# Patient Record
Sex: FEMALE | Race: WHITE | NOT HISPANIC OR LATINO
[De-identification: names, ages, dates, MRNs, and addresses within clinical notes are randomized per-mention and may not be internally consistent; named-entity substitution may affect disease eponyms.]

---

## 2017-03-16 ENCOUNTER — RESULT REVIEW (OUTPATIENT)
Age: 57
End: 2017-03-16

## 2019-01-14 ENCOUNTER — EMERGENCY (EMERGENCY)
Facility: HOSPITAL | Age: 59
LOS: 1 days | End: 2019-01-14
Attending: EMERGENCY MEDICINE
Payer: COMMERCIAL

## 2019-01-14 VITALS
HEART RATE: 123 BPM | TEMPERATURE: 98 F | HEIGHT: 65 IN | OXYGEN SATURATION: 97 % | RESPIRATION RATE: 18 BRPM | SYSTOLIC BLOOD PRESSURE: 119 MMHG | DIASTOLIC BLOOD PRESSURE: 80 MMHG | WEIGHT: 149.03 LBS

## 2019-01-14 VITALS
OXYGEN SATURATION: 98 % | TEMPERATURE: 98 F | SYSTOLIC BLOOD PRESSURE: 110 MMHG | DIASTOLIC BLOOD PRESSURE: 88 MMHG | RESPIRATION RATE: 16 BRPM | HEART RATE: 85 BPM

## 2019-01-14 LAB
ALBUMIN SERPL ELPH-MCNC: 4.3 G/DL — SIGNIFICANT CHANGE UP (ref 3.3–5)
ALP SERPL-CCNC: 36 U/L — LOW (ref 40–120)
ALT FLD-CCNC: 13 U/L — SIGNIFICANT CHANGE UP (ref 10–45)
ANION GAP SERPL CALC-SCNC: 15 MMOL/L — SIGNIFICANT CHANGE UP (ref 5–17)
AST SERPL-CCNC: 15 U/L — SIGNIFICANT CHANGE UP (ref 10–40)
BASOPHILS # BLD AUTO: 0 K/UL — SIGNIFICANT CHANGE UP (ref 0–0.2)
BASOPHILS NFR BLD AUTO: 0.6 % — SIGNIFICANT CHANGE UP (ref 0–2)
BILIRUB SERPL-MCNC: 0.3 MG/DL — SIGNIFICANT CHANGE UP (ref 0.2–1.2)
BUN SERPL-MCNC: 14 MG/DL — SIGNIFICANT CHANGE UP (ref 7–23)
CALCIUM SERPL-MCNC: 9.3 MG/DL — SIGNIFICANT CHANGE UP (ref 8.4–10.5)
CHLORIDE SERPL-SCNC: 99 MMOL/L — SIGNIFICANT CHANGE UP (ref 96–108)
CO2 SERPL-SCNC: 23 MMOL/L — SIGNIFICANT CHANGE UP (ref 22–31)
CREAT SERPL-MCNC: 0.71 MG/DL — SIGNIFICANT CHANGE UP (ref 0.5–1.3)
D DIMER BLD IA.RAPID-MCNC: 811 NG/ML DDU — HIGH
EOSINOPHIL # BLD AUTO: 0.1 K/UL — SIGNIFICANT CHANGE UP (ref 0–0.5)
EOSINOPHIL NFR BLD AUTO: 1.6 % — SIGNIFICANT CHANGE UP (ref 0–6)
GLUCOSE SERPL-MCNC: 100 MG/DL — HIGH (ref 70–99)
HCT VFR BLD CALC: 38.9 % — SIGNIFICANT CHANGE UP (ref 34.5–45)
HGB BLD-MCNC: 13.6 G/DL — SIGNIFICANT CHANGE UP (ref 11.5–15.5)
LYMPHOCYTES # BLD AUTO: 1.2 K/UL — SIGNIFICANT CHANGE UP (ref 1–3.3)
LYMPHOCYTES # BLD AUTO: 18 % — SIGNIFICANT CHANGE UP (ref 13–44)
MCHC RBC-ENTMCNC: 31.1 PG — SIGNIFICANT CHANGE UP (ref 27–34)
MCHC RBC-ENTMCNC: 35 GM/DL — SIGNIFICANT CHANGE UP (ref 32–36)
MCV RBC AUTO: 88.9 FL — SIGNIFICANT CHANGE UP (ref 80–100)
MONOCYTES # BLD AUTO: 0.4 K/UL — SIGNIFICANT CHANGE UP (ref 0–0.9)
MONOCYTES NFR BLD AUTO: 6.8 % — SIGNIFICANT CHANGE UP (ref 2–14)
NEUTROPHILS # BLD AUTO: 4.7 K/UL — SIGNIFICANT CHANGE UP (ref 1.8–7.4)
NEUTROPHILS NFR BLD AUTO: 73.1 % — SIGNIFICANT CHANGE UP (ref 43–77)
PLATELET # BLD AUTO: 348 K/UL — SIGNIFICANT CHANGE UP (ref 150–400)
POTASSIUM SERPL-MCNC: 3.8 MMOL/L — SIGNIFICANT CHANGE UP (ref 3.5–5.3)
POTASSIUM SERPL-SCNC: 3.8 MMOL/L — SIGNIFICANT CHANGE UP (ref 3.5–5.3)
PROT SERPL-MCNC: 7.2 G/DL — SIGNIFICANT CHANGE UP (ref 6–8.3)
RBC # BLD: 4.38 M/UL — SIGNIFICANT CHANGE UP (ref 3.8–5.2)
RBC # FLD: 11.6 % — SIGNIFICANT CHANGE UP (ref 10.3–14.5)
SODIUM SERPL-SCNC: 137 MMOL/L — SIGNIFICANT CHANGE UP (ref 135–145)
TROPONIN T, HIGH SENSITIVITY RESULT: <6 NG/L — SIGNIFICANT CHANGE UP (ref 0–51)
WBC # BLD: 6.4 K/UL — SIGNIFICANT CHANGE UP (ref 3.8–10.5)
WBC # FLD AUTO: 6.4 K/UL — SIGNIFICANT CHANGE UP (ref 3.8–10.5)

## 2019-01-14 PROCEDURE — 82962 GLUCOSE BLOOD TEST: CPT

## 2019-01-14 PROCEDURE — 70498 CT ANGIOGRAPHY NECK: CPT | Mod: 26

## 2019-01-14 PROCEDURE — 99284 EMERGENCY DEPT VISIT MOD MDM: CPT | Mod: 25

## 2019-01-14 PROCEDURE — 71275 CT ANGIOGRAPHY CHEST: CPT | Mod: 26

## 2019-01-14 PROCEDURE — 85379 FIBRIN DEGRADATION QUANT: CPT

## 2019-01-14 PROCEDURE — 96374 THER/PROPH/DIAG INJ IV PUSH: CPT | Mod: XU

## 2019-01-14 PROCEDURE — 85610 PROTHROMBIN TIME: CPT

## 2019-01-14 PROCEDURE — 73502 X-RAY EXAM HIP UNI 2-3 VIEWS: CPT

## 2019-01-14 PROCEDURE — 83880 ASSAY OF NATRIURETIC PEPTIDE: CPT

## 2019-01-14 PROCEDURE — 93010 ELECTROCARDIOGRAM REPORT: CPT

## 2019-01-14 PROCEDURE — 70496 CT ANGIOGRAPHY HEAD: CPT

## 2019-01-14 PROCEDURE — 99285 EMERGENCY DEPT VISIT HI MDM: CPT | Mod: 25

## 2019-01-14 PROCEDURE — 85730 THROMBOPLASTIN TIME PARTIAL: CPT

## 2019-01-14 PROCEDURE — 73502 X-RAY EXAM HIP UNI 2-3 VIEWS: CPT | Mod: 26,RT

## 2019-01-14 PROCEDURE — 93005 ELECTROCARDIOGRAM TRACING: CPT

## 2019-01-14 PROCEDURE — 70498 CT ANGIOGRAPHY NECK: CPT

## 2019-01-14 PROCEDURE — 71275 CT ANGIOGRAPHY CHEST: CPT

## 2019-01-14 PROCEDURE — 96375 TX/PRO/DX INJ NEW DRUG ADDON: CPT | Mod: XU

## 2019-01-14 PROCEDURE — 80053 COMPREHEN METABOLIC PANEL: CPT

## 2019-01-14 PROCEDURE — 85027 COMPLETE CBC AUTOMATED: CPT

## 2019-01-14 PROCEDURE — 84484 ASSAY OF TROPONIN QUANT: CPT

## 2019-01-14 PROCEDURE — 70496 CT ANGIOGRAPHY HEAD: CPT | Mod: 26

## 2019-01-14 RX ORDER — SODIUM CHLORIDE 9 MG/ML
1000 INJECTION INTRAMUSCULAR; INTRAVENOUS; SUBCUTANEOUS ONCE
Qty: 0 | Refills: 0 | Status: COMPLETED | OUTPATIENT
Start: 2019-01-14 | End: 2019-01-14

## 2019-01-14 RX ORDER — ACETAMINOPHEN 500 MG
1000 TABLET ORAL ONCE
Qty: 0 | Refills: 0 | Status: COMPLETED | OUTPATIENT
Start: 2019-01-14 | End: 2019-01-14

## 2019-01-14 RX ORDER — METOCLOPRAMIDE HCL 10 MG
10 TABLET ORAL ONCE
Qty: 0 | Refills: 0 | Status: COMPLETED | OUTPATIENT
Start: 2019-01-14 | End: 2019-01-14

## 2019-01-14 RX ADMIN — SODIUM CHLORIDE 2000 MILLILITER(S): 9 INJECTION INTRAMUSCULAR; INTRAVENOUS; SUBCUTANEOUS at 20:08

## 2019-01-14 RX ADMIN — Medication 10 MILLIGRAM(S): at 20:08

## 2019-01-14 RX ADMIN — Medication 400 MILLIGRAM(S): at 18:37

## 2019-01-14 NOTE — ED ADULT NURSE REASSESSMENT NOTE - NS ED NURSE REASSESS COMMENT FT1
Pt d/c home w/ written and verbal instructions. Pt verbalized understanding. IV d/c. No s&s of infection/infiltration. VSS. Pt states " I am ready to go home". Pt assisted to waiting room via wheelchair. VSS NAD. Safety and comfort measures maintained.

## 2019-01-14 NOTE — ED PROVIDER NOTE - OBJECTIVE STATEMENT
59yo F hx of total C spine fusion 4 weeks prior here with gradual onset b/l pressure like headache (no hx of headaches), n/v, decreased PO, myalgias, and intermittent vague R sided chest pain (no hx of exertional sx), and subjective fevers. Tylenol to no relief at home. Pt states she felt very lightheaded while going down stairs today at 11am, and hit her head into a wall, no loc, did not have cp, sob, or palpitations at that time. No abd pain, sob, back pain, urinary sx, focal neuro deficit complaints, or neck pain. Has been asymptomatic since the surgery up until friday. No sick contacts. States has also been having R hip pain

## 2019-01-14 NOTE — ED ADULT NURSE REASSESSMENT NOTE - NS ED NURSE REASSESS COMMENT FT1
Report received from Columba YARBROUGH. Pt AAOx4, NAD, resp nonlabored, skin warm/dry, resting comfortably in bed with family at bedside. Pt states "I feel much better than when I came in". Pt denies headache, dizziness, chest pain, palpitations, SOB, abd pain, n/v/d, urinary symptoms, fevers, chills, weakness at this time. Pt awaiting CT scan results . Safety maintained.

## 2019-01-14 NOTE — ED ADULT NURSE NOTE - OBJECTIVE STATEMENT
58y.o female pmh c-spin fusion surgery 4 weeks ago presenting to ED c.o HA, Nausea, vomiting, decreased PO intake and body aches x the passed few days. pt states she has been taking tylenol at home with no relief. states that she has been feeling worsening weakness associated with a pressure like HA. denies having any s/s after the surgery or up until now. verbalizes feeling well until approx 3 days ago. denies any known fevers, sick contacts, cp, sob, numbness, tingling, burning upon urination or back pain. labs and line obtained. pending results. safety and fall precautions maintained.

## 2019-01-14 NOTE — ED ADULT TRIAGE NOTE - CHIEF COMPLAINT QUOTE
S/P spinal surgery 4 weeks ago. Right hip pain, headache, nausea, dizziness that started Friday night.

## 2019-01-14 NOTE — ED PROVIDER NOTE - MEDICAL DECISION MAKING DETAILS
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

## 2019-01-14 NOTE — ED PROVIDER NOTE - PHYSICAL EXAMINATION
Gen: Well appearing, NAD  Head: NCAT  HEENT: PERRL, MMM, normal conjunctiva, anicteric, well healed surgical scar on neck  Lung: CTAB, no adventitious sounds  CV: RRR, no murmurs  Abd: soft, NTND, no rebound or guarding, no CVAT  MSK: No edema, no visible deformities, R hip full ROM nonttp  Neuro: CN II-XII grossly intact. 5/5 strength and normal sensation in all extremities. Cerebellar signs intact  Skin: Warm and dry, no evidence of rash  Psych: normal mood and affect

## 2019-01-14 NOTE — ED PROVIDER NOTE - ATTENDING CONTRIBUTION TO CARE
I have seen and evaluated this patient with the resident.   I agree with the findings  unless other wise stated.  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

## 2019-01-14 NOTE — ED ADULT NURSE REASSESSMENT NOTE - NS ED NURSE REASSESS COMMENT FT1
Patient a&ox3, no acute distress, resp nonlabored, resting comfortably in bed with family at bedside.  C/o.  Denies headache, dizziness, chest pain, palpitations, SOB, abd pain, n/v/d, urinary symptoms, fevers, chills, weakness at this time. Patient awaiting CT scan results. VSS NAD Safety maintained.

## 2019-01-15 ENCOUNTER — INPATIENT (INPATIENT)
Facility: HOSPITAL | Age: 59
LOS: 1 days | Discharge: HOME | DRG: 556 | End: 2019-01-17
Attending: INTERNAL MEDICINE | Admitting: HOSPITALIST
Payer: COMMERCIAL

## 2019-01-15 VITALS
OXYGEN SATURATION: 100 % | RESPIRATION RATE: 18 BRPM | WEIGHT: 149.03 LBS | TEMPERATURE: 98 F | DIASTOLIC BLOOD PRESSURE: 81 MMHG | SYSTOLIC BLOOD PRESSURE: 127 MMHG | HEART RATE: 86 BPM | HEIGHT: 65 IN

## 2019-01-15 PROCEDURE — 72192 CT PELVIS W/O DYE: CPT | Mod: 26

## 2019-01-15 PROCEDURE — 99285 EMERGENCY DEPT VISIT HI MDM: CPT

## 2019-01-15 PROCEDURE — 76377 3D RENDER W/INTRP POSTPROCES: CPT | Mod: 26

## 2019-01-15 RX ORDER — ONDANSETRON 8 MG/1
4 TABLET, FILM COATED ORAL ONCE
Qty: 0 | Refills: 0 | Status: COMPLETED | OUTPATIENT
Start: 2019-01-15 | End: 2019-01-15

## 2019-01-15 RX ORDER — SODIUM CHLORIDE 9 MG/ML
1000 INJECTION INTRAMUSCULAR; INTRAVENOUS; SUBCUTANEOUS
Qty: 0 | Refills: 0 | Status: DISCONTINUED | OUTPATIENT
Start: 2019-01-15 | End: 2019-01-16

## 2019-01-15 RX ORDER — ONDANSETRON 8 MG/1
4 TABLET, FILM COATED ORAL ONCE
Qty: 0 | Refills: 0 | Status: DISCONTINUED | OUTPATIENT
Start: 2019-01-15 | End: 2019-01-15

## 2019-01-15 RX ORDER — MORPHINE SULFATE 50 MG/1
4 CAPSULE, EXTENDED RELEASE ORAL ONCE
Qty: 0 | Refills: 0 | Status: DISCONTINUED | OUTPATIENT
Start: 2019-01-15 | End: 2019-01-15

## 2019-01-15 RX ADMIN — MORPHINE SULFATE 4 MILLIGRAM(S): 50 CAPSULE, EXTENDED RELEASE ORAL at 21:46

## 2019-01-15 RX ADMIN — MORPHINE SULFATE 4 MILLIGRAM(S): 50 CAPSULE, EXTENDED RELEASE ORAL at 18:00

## 2019-01-15 RX ADMIN — SODIUM CHLORIDE 150 MILLILITER(S): 9 INJECTION INTRAMUSCULAR; INTRAVENOUS; SUBCUTANEOUS at 19:02

## 2019-01-15 RX ADMIN — MORPHINE SULFATE 4 MILLIGRAM(S): 50 CAPSULE, EXTENDED RELEASE ORAL at 22:00

## 2019-01-15 RX ADMIN — MORPHINE SULFATE 4 MILLIGRAM(S): 50 CAPSULE, EXTENDED RELEASE ORAL at 18:15

## 2019-01-15 RX ADMIN — ONDANSETRON 4 MILLIGRAM(S): 8 TABLET, FILM COATED ORAL at 18:00

## 2019-01-15 NOTE — ED PROVIDER NOTE - ATTENDING CONTRIBUTION TO CARE
I have seen and evaluated this patient with the resident.   I agree with the findings  unless other wise stated.  I have made appropriate changes in documentations where needed, After my face to face bedside evaluation, I am further  noting: Pt seen by me yestaerday and today as she was recalled for concern about hip xrays has some concern about stress lines ? fracture No trauma no fever no numbness Pt does have pains in bothh knees for days also c/o headaches band like no rash no vomiting Again detailed exam repeated NO sogns of meningeal irritation no mary NO focal neurological exam clear lungs pain control with iv morphine CT pelvis and hips for concern of fracture Ortho and Neuro eval done will CDU obs for pain control and definitive study to r/o hip fracture with an MRI --Montelongo

## 2019-01-15 NOTE — ED POST DISCHARGE NOTE - OTHER COMMUNICATION
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

## 2019-01-15 NOTE — ED POST DISCHARGE NOTE - RESULT SUMMARY
[place] prelim discrepancy: XR Hip with pelvis 2-3 review right: Mild cortical thickening at the proximal lateral femoral cortex which is c/w age-indeterminate stress reaction, correlate for bisphosphonate use. Also L glute medius calcific tendinosis, minimal pubic "sepsis" arthrosis. Essure devices noted. Hip joint spaces preserved with minimal later acetabular spurring. (prelim "no acute fracture")

## 2019-01-15 NOTE — ED ADULT NURSE NOTE - NSIMPLEMENTINTERV_GEN_ALL_ED
Implemented All Universal Safety Interventions:  [place] to call system. Call bell, personal items and telephone within reach. Instruct patient to call for assistance. Room bathroom lighting operational. Non-slip footwear when patient is off stretcher. Physically safe environment: no spills, clutter or unnecessary equipment. Stretcher in lowest position, wheels locked, appropriate side rails in place.

## 2019-01-15 NOTE — ED PROVIDER NOTE - PHYSICAL EXAMINATION
Arlin Hernandez M.D.:   patient awake alert seen lying in stretcher in mild distress 2/2 HA.   LUNGS CTAB no wheeze no crackle.   CARD RRR no m/r/g.    Abdomen soft NT ND no rebound no guarding no CVA tenderness.   EXT WWP +righ hip ttp no obvious bony deformity. no edema no calf tenderness CV 2+DP/PT bilaterally.   neuro A&Ox3 cn 2-12 intact, gross motor and sensory intact in all extremities limping due to right hip pain.    skin warm and dry no rash  HEENT: moist mucous membranes, PERRL, EOMI

## 2019-01-15 NOTE — ED ADULT TRIAGE NOTE - CHIEF COMPLAINT QUOTE
Nausea/Headache/left hip pain since 01/11, dec 18th cervical surgery, evaluated yesterday and called back today to return to ED for CT finding- unknown

## 2019-01-15 NOTE — ED PROVIDER NOTE - MEDICAL DECISION MAKING DETAILS
Pt seen by me yestaerday and today as she was recalled for concern about hip xrays has some concern about stress lines ? fracture No trauma no fever no numbness Pt does have pains in bothh knees for days also c/o headaches band like no rash no vomiting Again detailed exam repeated NO sogns of meningeal irritation no mary NO focal neurological exam clear lungs pain control with iv morphine CT pelvis and hips for concern of fracture Ortho and Neuro eval done will CDU obs for pain control and definitive study to r/o hip fracture with an MRI --Montelongo

## 2019-01-15 NOTE — CONSULT NOTE ADULT - SUBJECTIVE AND OBJECTIVE BOX
Neurology Consult    Reason for consult: Headache    HPI:   As per patient, headache 10/10, band like distribution around the temples and back of the head, intermittent, first started s/p cervical fusion in December.     REVIEW OF SYSTEMS:  As above    MEDICATIONS  sodium chloride 0.9%. 1000 milliLiter(s) IV Continuous <Continuous>    PMH: No pertinent past medical history    PSH: No significant past surgical history    FAMILY HISTORY:  No pertinent family history in first degree relatives    No history of dementia, strokes, or seizures     SOCIAL HISTORY:  No history of tobacco or alcohol use     Allergies  gentamicin (Hives)  Levaquin (Seizure)    Intolerances    Vital Signs Last 24 Hrs  T(C): 36.6 (15 Arya 2019 19:36), Max: 36.6 (15 Arya 2019 14:50)  T(F): 97.9 (15 Arya 2019 19:36), Max: 97.9 (15 Arya 2019 19:36)  HR: 93 (15 Arya 2019 23:06) (86 - 93)  BP: 119/75 (15 Arya 2019 23:06) (99/62 - 127/81)  BP(mean): --  RR: 18 (15 Arya 2019 23:06) (18 - 18)  SpO2: 97% (15 Arya 2019 23:06) (97% - 100%)    Neurological Examination:    Mental Status: Patient is alert, awake, oriented X3. Patient is fluent, no dysarthria, no aphasia. Follows commands well and able to answer questions appropriately.     Cranial Nerves: PERRL, EOMI, visual field intact, fundi could not be visualized, V1-V3 intact, no gross facial asymmetry, tongue/uvula midline    Motor Exam: No drift  Right upper extremity: 5/5  Left upper extremity: 5/5  Right lower extremity: 5/5  Left lower extremity: 5/5    Normal bulk/tone    Sensory: Intact to light touch bilaterally. No extinction    Coordination: Finger to nose intact bilaterally     Reflexes: Bilateral 2+ Biceps, Brachial, Patellar, Ankle  Plantar: Down bilateral    GENERAL: No acute distress  HEENT:  Normocephalic, atraumatic  EXTREMITIES: No edema, clubbing, cyanosis  MUSCULOSKELETAL: Normal range of motion  SKIN: No rashes    LABS:  CBC Full  -  ( 14 Jan 2019 18:41 )  WBC Count : 6.4 K/uL  Hemoglobin : 13.6 g/dL  Hematocrit : 38.9 %  Platelet Count - Automated : 348 K/uL  Mean Cell Volume : 88.9 fl  Mean Cell Hemoglobin : 31.1 pg  Mean Cell Hemoglobin Concentration : 35.0 gm/dL  Auto Neutrophil # : 4.7 K/uL  Auto Lymphocyte # : 1.2 K/uL  Auto Monocyte # : 0.4 K/uL  Auto Eosinophil # : 0.1 K/uL  Auto Basophil # : 0.0 K/uL  Auto Neutrophil % : 73.1 %  Auto Lymphocyte % : 18.0 %  Auto Monocyte % : 6.8 %  Auto Eosinophil % : 1.6 %  Auto Basophil % : 0.6 %      01-14    137  |  99  |  14  ----------------------------<  100<H>  3.8   |  23  |  0.71    Ca    9.3      14 Jan 2019 18:41    TPro  7.2  /  Alb  4.3  /  TBili  0.3  /  DBili  x   /  AST  15  /  ALT  13  /  AlkPhos  36<L>  01-14    LIVER FUNCTIONS - ( 14 Jan 2019 18:41 )  Alb: 4.3 g/dL / Pro: 7.2 g/dL / ALK PHOS: 36 U/L / ALT: 13 U/L / AST: 15 U/L / GGT: x           Hemoglobin A1C:       PT/INR - ( 14 Jan 2019 18:41 )   PT: 11.4 sec;   INR: 0.99 ratio         PTT - ( 14 Jan 2019 18:41 )  PTT:32.8 sec    RADIOLOGY:  CT head:  MRI:

## 2019-01-15 NOTE — ED PROVIDER NOTE - OBJECTIVE STATEMENT
58F called back in for reimaging of left hip given xray showing questionable stress fracture and pt still with pain. also complaining of persistent headaches in the setting of recent cervical fusion. states that when she walks she feels like her hip will give out. no numbness/tingling/weakness in extremity. still walking. 58F called back in for reimaging of right hip given xray showing questionable stress fracture and pt still with pain. also complaining of persistent headaches in the setting of recent cervical fusion. states that when she walks she feels like her hip will give out. no numbness/tingling/weakness in extremity. still walking.

## 2019-01-15 NOTE — ED POST DISCHARGE NOTE - DETAILS
Results d/w patient. Pt states she is still feeling R hip pain especially with weight bearing, felt as though it was "giving way" this morning when she got out of bed. No bisphosphonate use. Pt advised to return to ER for reeval and further imaging if warranted, ortho consult. Avoid weight bearing on the affected leg. Pt states she will return to the ER as soon as possible. -Jordan Ledbetter PA-C

## 2019-01-15 NOTE — ED ADULT NURSE NOTE - OBJECTIVE STATEMENT
Pt is a 57yo  F who came to the ED amb c/o headache x 4 days. States she was seen here yesterday for the same, but headache continues. Hx recent cervical fusion. A/O x3, no dizziness/lightheadedness, no n/v, pain is constant, sharp, throbbing.

## 2019-01-15 NOTE — ED ADULT NURSE REASSESSMENT NOTE - NS ED NURSE REASSESS COMMENT FT1
Report received from LINNEA Carreno. Patients vital signs are stable, patient denies SOB, chest pain, N/V/D. Patient awaiting results of diagnostic scanning (CT scan).

## 2019-01-15 NOTE — ED ADULT NURSE NOTE - CHPI ED NUR SYMPTOMS NEG
no dizziness/no weakness/no chills/no nausea/no fever/no vomiting/no tingling/no decreased eating/drinking

## 2019-01-15 NOTE — ED ADULT NURSE NOTE - NSFALLRSKHARMRISK_ED_ALL_ED
Patient presents with left knee injury from today around 6pm when he hit the corner of a wall chasing the dog. No head injury or other injury noted. Hurts to bear weight. +Abrasion noted. +Cms
no

## 2019-01-15 NOTE — CONSULT NOTE ADULT - ASSESSMENT
58F no PMH p/w R hip stress fracture, neurology consulted for headache in the setting of cervical fusion. CT head/CTA head w/ contrast unremarkable. Exam non focal. In my judgement, not concerning for SAH as not thunderclap onset, distribution not consistent with temporal arteritis but can obtain ESR, clinical picture not consistent with meningitis.    Recs:  ESR  Can consider reglan, 1g IV magnesium for headache  Pain control  Would avoid opiates due to risk of rebound headache

## 2019-01-16 ENCOUNTER — TRANSCRIPTION ENCOUNTER (OUTPATIENT)
Age: 59
End: 2019-01-16

## 2019-01-16 DIAGNOSIS — R51 HEADACHE: ICD-10-CM

## 2019-01-16 DIAGNOSIS — Z79.899 OTHER LONG TERM (CURRENT) DRUG THERAPY: ICD-10-CM

## 2019-01-16 DIAGNOSIS — Z29.9 ENCOUNTER FOR PROPHYLACTIC MEASURES, UNSPECIFIED: ICD-10-CM

## 2019-01-16 DIAGNOSIS — Z98.890 OTHER SPECIFIED POSTPROCEDURAL STATES: Chronic | ICD-10-CM

## 2019-01-16 LAB
ANION GAP SERPL CALC-SCNC: 15 MMOL/L — SIGNIFICANT CHANGE UP (ref 5–17)
APTT BLD: 30.3 SEC — SIGNIFICANT CHANGE UP (ref 27.5–36.3)
BLD GP AB SCN SERPL QL: NEGATIVE — SIGNIFICANT CHANGE UP
BUN SERPL-MCNC: 13 MG/DL — SIGNIFICANT CHANGE UP (ref 7–23)
CALCIUM SERPL-MCNC: 8.5 MG/DL — SIGNIFICANT CHANGE UP (ref 8.4–10.5)
CHLORIDE SERPL-SCNC: 101 MMOL/L — SIGNIFICANT CHANGE UP (ref 96–108)
CO2 SERPL-SCNC: 22 MMOL/L — SIGNIFICANT CHANGE UP (ref 22–31)
CREAT SERPL-MCNC: 0.61 MG/DL — SIGNIFICANT CHANGE UP (ref 0.5–1.3)
CRP SERPL-MCNC: 0.26 MG/DL — SIGNIFICANT CHANGE UP (ref 0–0.4)
ERYTHROCYTE [SEDIMENTATION RATE] IN BLOOD: 14 MM/HR — SIGNIFICANT CHANGE UP (ref 0–20)
GLUCOSE SERPL-MCNC: 91 MG/DL — SIGNIFICANT CHANGE UP (ref 70–99)
INR BLD: 1.03 RATIO — SIGNIFICANT CHANGE UP (ref 0.88–1.16)
POTASSIUM SERPL-MCNC: 3.8 MMOL/L — SIGNIFICANT CHANGE UP (ref 3.5–5.3)
POTASSIUM SERPL-SCNC: 3.8 MMOL/L — SIGNIFICANT CHANGE UP (ref 3.5–5.3)
PROTHROM AB SERPL-ACNC: 11.7 SEC — SIGNIFICANT CHANGE UP (ref 10–12.9)
RH IG SCN BLD-IMP: POSITIVE — SIGNIFICANT CHANGE UP
SODIUM SERPL-SCNC: 138 MMOL/L — SIGNIFICANT CHANGE UP (ref 135–145)

## 2019-01-16 PROCEDURE — 99222 1ST HOSP IP/OBS MODERATE 55: CPT

## 2019-01-16 PROCEDURE — 99223 1ST HOSP IP/OBS HIGH 75: CPT

## 2019-01-16 PROCEDURE — 73721 MRI JNT OF LWR EXTRE W/O DYE: CPT | Mod: 26,RT

## 2019-01-16 PROCEDURE — 73552 X-RAY EXAM OF FEMUR 2/>: CPT | Mod: 26,RT

## 2019-01-16 PROCEDURE — 12345: CPT | Mod: NC

## 2019-01-16 RX ORDER — ONDANSETRON 8 MG/1
4 TABLET, FILM COATED ORAL ONCE
Qty: 0 | Refills: 0 | Status: COMPLETED | OUTPATIENT
Start: 2019-01-16 | End: 2019-01-16

## 2019-01-16 RX ORDER — KETOROLAC TROMETHAMINE 30 MG/ML
30 SYRINGE (ML) INJECTION EVERY 8 HOURS
Qty: 0 | Refills: 0 | Status: DISCONTINUED | OUTPATIENT
Start: 2019-01-16 | End: 2019-01-17

## 2019-01-16 RX ORDER — SODIUM CHLORIDE 9 MG/ML
1000 INJECTION INTRAMUSCULAR; INTRAVENOUS; SUBCUTANEOUS
Qty: 0 | Refills: 0 | Status: COMPLETED | OUTPATIENT
Start: 2019-01-16 | End: 2019-01-16

## 2019-01-16 RX ORDER — BUPROPION HYDROCHLORIDE 150 MG/1
0 TABLET, EXTENDED RELEASE ORAL
Qty: 0 | Refills: 0 | COMMUNITY

## 2019-01-16 RX ORDER — ACETAMINOPHEN 500 MG
1000 TABLET ORAL ONCE
Qty: 0 | Refills: 0 | Status: COMPLETED | OUTPATIENT
Start: 2019-01-16 | End: 2019-01-16

## 2019-01-16 RX ORDER — ENOXAPARIN SODIUM 100 MG/ML
40 INJECTION SUBCUTANEOUS EVERY 24 HOURS
Qty: 0 | Refills: 0 | Status: DISCONTINUED | OUTPATIENT
Start: 2019-01-16 | End: 2019-01-17

## 2019-01-16 RX ORDER — KETOROLAC TROMETHAMINE 30 MG/ML
30 SYRINGE (ML) INJECTION ONCE
Qty: 0 | Refills: 0 | Status: DISCONTINUED | OUTPATIENT
Start: 2019-01-16 | End: 2019-01-16

## 2019-01-16 RX ORDER — ACETAMINOPHEN 500 MG
650 TABLET ORAL EVERY 6 HOURS
Qty: 0 | Refills: 0 | Status: DISCONTINUED | OUTPATIENT
Start: 2019-01-16 | End: 2019-01-17

## 2019-01-16 RX ORDER — BUPROPION HYDROCHLORIDE 150 MG/1
150 TABLET, EXTENDED RELEASE ORAL DAILY
Qty: 0 | Refills: 0 | Status: DISCONTINUED | OUTPATIENT
Start: 2019-01-16 | End: 2019-01-17

## 2019-01-16 RX ORDER — METOCLOPRAMIDE HCL 10 MG
10 TABLET ORAL EVERY 8 HOURS
Qty: 0 | Refills: 0 | Status: DISCONTINUED | OUTPATIENT
Start: 2019-01-16 | End: 2019-01-17

## 2019-01-16 RX ADMIN — SODIUM CHLORIDE 150 MILLILITER(S): 9 INJECTION INTRAMUSCULAR; INTRAVENOUS; SUBCUTANEOUS at 04:19

## 2019-01-16 RX ADMIN — Medication 30 MILLIGRAM(S): at 08:57

## 2019-01-16 RX ADMIN — Medication 10 MILLIGRAM(S): at 21:05

## 2019-01-16 RX ADMIN — Medication 400 MILLIGRAM(S): at 11:19

## 2019-01-16 RX ADMIN — Medication 30 MILLIGRAM(S): at 04:14

## 2019-01-16 RX ADMIN — ONDANSETRON 4 MILLIGRAM(S): 8 TABLET, FILM COATED ORAL at 11:13

## 2019-01-16 RX ADMIN — Medication 1000 MILLIGRAM(S): at 19:52

## 2019-01-16 RX ADMIN — Medication 30 MILLIGRAM(S): at 22:05

## 2019-01-16 RX ADMIN — Medication 30 MILLIGRAM(S): at 05:40

## 2019-01-16 RX ADMIN — Medication 30 MILLIGRAM(S): at 21:05

## 2019-01-16 RX ADMIN — SODIUM CHLORIDE 100 MILLILITER(S): 9 INJECTION INTRAMUSCULAR; INTRAVENOUS; SUBCUTANEOUS at 07:05

## 2019-01-16 RX ADMIN — Medication 30 MILLIGRAM(S): at 09:30

## 2019-01-16 RX ADMIN — Medication 400 MILLIGRAM(S): at 19:02

## 2019-01-16 NOTE — H&P ADULT - PROBLEM SELECTOR PLAN 1
Reviewed Xray of hip of 1/14. Reviewed prelim reports of Xray of right femur and CT pelvis.  Orthopedic eval and consult reviewed and appreciated  MRI of RIGHT LE  No weight bearing until MRI obtained  OOB  Pain control, PRN

## 2019-01-16 NOTE — CHART NOTE - NSCHARTNOTEFT_GEN_A_CORE
D/w pt and  at bedside.  Pt visibly dissapointed in care she has received.  D/w patient she will have to remain NPO for MRI which could be moved up to as early as 1530 or even earlier.  Pt and  wish to leave AMA, I recommended staying in hospital for procedure and she is not cleared from Orthopedic standpoint for DC, stated she will be non weight bearing on RLE.  D/w patient and  risk of pt leaving and bearing weight on RLE, explained in depth, pt and  aware of risks leaving AMA

## 2019-01-16 NOTE — PATIENT PROFILE ADULT - LAST ORAL INTAKE
Getting more tired  GBS today  
Ultrasound results were reviewed with the patient explained about the discrepancy. Will talk to perinatology for further plan for delivery.  
15-Arya-2019 08:00

## 2019-01-16 NOTE — DISCHARGE NOTE ADULT - MEDICATION SUMMARY - MEDICATIONS TO TAKE
I will START or STAY ON the medications listed below when I get home from the hospital:    Paxil CR 25 mg oral tablet, extended release  -- 1 tab(s) by mouth once a day (in the morning)  -- Indication: For Depression/Anxiety     Wellbutrin  mg/24 hours oral tablet, extended release  -- 1 tab(s) by mouth once a day (in the morning)  -- Indication: For Depression/Anxiety

## 2019-01-16 NOTE — CHART NOTE - NSCHARTNOTEFT_GEN_A_CORE
Called  by  Rn at 0400  that  pt  has  headache  4/10.   Pt  states  it  similar  to  headache  she  had  prior.   Pt  denies  any  blurry  vision,  chest  pressure  or  sob.  Patient is a 58y old  Female who presents with a chief complaint of right hip pain and headache (16 Jan 2019 06:03)      Vital Signs Last 24 Hrs  T(C): 37.4 (16 Jan 2019 05:26), Max: 37.4 (16 Jan 2019 05:26)  T(F): 99.3 (16 Jan 2019 05:26), Max: 99.3 (16 Jan 2019 05:26)  HR: 92 (16 Jan 2019 05:26) (83 - 105)  BP: 99/61 (16 Jan 2019 05:26) (99/61 - 127/81)  BP(mean): --  RR: 18 (16 Jan 2019 05:26) (18 - 18)  SpO2: 96% (16 Jan 2019 05:26) (96% - 100%)      Labs:                          12.4   6.4   )-----------( 318      ( 16 Jan 2019 01:46 )             35.6     01-16    138  |  101  |  13  ----------------------------<  91  3.8   |  22  |  0.61    Ca    8.5      16 Jan 2019 01:46    TPro  7.2  /  Alb  4.3  /  TBili  0.3  /  DBili  x   /  AST  15  /  ALT  13  /  AlkPhos  36<L>  01-14        Radiology:    Physical Exam:  General: WN/WD NAD  Neurology: A&Ox3, nonfocal, MORFIN x 4  Head:  Normocephalic, atraumatic  Respiratory: CTA B/L  CV: RRR, S1S2, no murmur  Abdominal: Soft, NT, ND no palpable mass  MSK: No edema, + peripheral pulses, FROM all 4 extremity    Assessment & Plan:  HPI:  59 yo woman with no pertinent PMH with exception of depression/anxiery, C-spine fusion ~ 4 weeks ago presents in setting of right hip pain which began on Friday. Described the pain as soreness present at rest, worsened and more severe upon standing and bearing weight on the right leg. Pain would be unbearable and cause her leg to buckle. She would have to seek assistance of her  to ambulate  in her home. Denies recent trauma. Denies associated numbness/paresthesia/weakness in the right leg. Patient had an xray on 1/14 on her initial visit to the ED where she had an Xray which was initially read a no fracture. Nely was called back when xray was reviewd and found with "Mild cortical thickening in the proximal lateral femoral cortex which is consistent with age-indeterminate stress reaction." With regards to the headache, patient describes it as a bandlike distribution with a component of phono- and photophobia. Lso endorses intermittent dizzines. Denies visual changes, nausea, emesis. Onset of symptoms began on Friday. (16 Jan 2019 06:03)  Now  with  headache  at 0400    >  >Torodal  30  mg  IVP  x1  dose  >Re-asses  pain level   >        Follow up with Attending in AM.

## 2019-01-16 NOTE — DISCHARGE NOTE ADULT - PLAN OF CARE
Pain management Resolved. MRI with No fracture or proximal femoral stress reaction. Follow up with orthopedic MD for management Follow up with PMD , Neurology for management

## 2019-01-16 NOTE — H&P ADULT - ATTENDING COMMENTS
Patient was previously unknown to me. Patient was assigned to me by hospitalist in charge. My involvement in this case consisted of initial history, physical and management plan. Primary day team to assume care in AM and thereafter. Case discussed in detail with overnight medicine NP/PA Patient was previously unknown to me. Patient was assigned to me by hospitalist in charge. My involvement in this case consisted of initial history, physical and management plan. Primary day team to assume care in AM and thereafter. Case discussed in detail with overnight medicine NP/CRUZ Gutierrez [phone]

## 2019-01-16 NOTE — DISCHARGE NOTE ADULT - PATIENT PORTAL LINK FT
You can access the [facility] Patient Portal, offered by [facility], by registering with the following website: [email or web address]

## 2019-01-16 NOTE — DISCHARGE NOTE ADULT - CARE PROVIDER_API CALL
Wil Denis), Internal Medicine  [address] [place] [address]  Phone: [phone]  Fax: [phone] Wil Denis), Internal Medicine  [address] [facility] [address]  [place] [address]  Phone: [phone]  Fax: [phone]    Иван López), Orthopaedic Surgery  [address] [facility] [address]  [place] [address]  Phone: [phone]  Fax: [phone]

## 2019-01-16 NOTE — CHART NOTE - NSCHARTNOTEFT_GEN_A_CORE
A/P: 57 y/o woman w/ PMH anxiety/depression and recent cervical spine fusion (12/2018) p/w R hip pain w/ CT pelvis concerning for possible R lateral proximal femur fx, also w/ headache.     1) r/o Femur Fracture: d/w ortho and MRI tech. Scheduled for early this afternoon.    F/U MRI results; if fracture, patient will go for surgery this evening (will remain NPO for now). Patient is medically optimized for possible surgery.  pt currently left from Er holding to 7T  plan   this pt sign out to NP Cindy Reyes who is covring 7 T  Melinda Boss RPA-c

## 2019-01-16 NOTE — H&P ADULT - RS GEN PE MLT RESP DETAILS PC
no rales/no wheezes/airway patent/no chest wall tenderness/no rhonchi/good air movement/respirations non-labored/breath sounds equal

## 2019-01-16 NOTE — CHART NOTE - NSCHARTNOTEFT_GEN_A_CORE
Patient is a 57 y/o F with a chief complaint of right hip pain. Patient scheduled for MRI to rule out fracture to the right hip.   MRI official results reviewed and found to be negative. Discussed with orthopedic resident. Patient is orthopedically stable for discharge.     Jennifer Mckee PA-C Orthopaedic Surgery  Team pager [phone]  [phone] Patient is a 57 y/o F with a chief complaint of right hip pain. Patient scheduled for MRI to rule out fracture to the right hip.   MRI official results reviewed and found to be negative. Discussed with orthopedic resident. Patient is orthopedically stable for discharge.   Patient may follow up with Dr. López as outpatient for further orthopedic needs.     Jennifer Mckee PA-C Orthopaedic Surgery  Team pager [phone]  [phone]

## 2019-01-16 NOTE — H&P ADULT - FAMILY HISTORY
Mother  Still living? Unknown  Family history of osteoporosis in mother, Age at diagnosis: Age Unknown

## 2019-01-16 NOTE — H&P ADULT - ASSESSMENT
59 yo woman with no pertinent PMH with exception of C-spine fusion ~ 4 weeks ago presents in setting of right hip pain with suspected occult fracture of the right hip and headache.

## 2019-01-16 NOTE — CONSULT NOTE ADULT - SUBJECTIVE AND OBJECTIVE BOX
Orthopaedic Surgery Consult Note      HPI: 58F with PMH of C2-C7 fusion at [facility] (12/2018) who presents complaining of atraumatic R hip pain. Patient initially presented yesterday complaining of about 5 days of atraumatic R hip pain exacerbated by ROM and worse when bearing weight. XRs of R hip were read as negative but the read was addended due to possibility of R femoral stress fracture. Patient complains up having severe difficultly bearing weight. She had been doing well s/p C spine fusion until the pain started.   She says that when she walks she feels like her hip will give out and thus cannot tolerate ambulating. . Denies fever, chills, SOB, CP, numbness, tingling.  weakness in RLE.       PAST MEDICAL & SURGICAL HISTORY:  No pertinent past medical history  No significant past surgical history    [] No significant past history as reviewed with the patient and family    FAMILY HISTORY:  No pertinent family history in first degree relatives    [] Family history not pertinent as reviewed with the patient and family    SOCIAL HISTORY:    MEDICATIONS  (STANDING):  sodium chloride 0.9%. 1000 milliLiter(s) (150 mL/Hr) IV Continuous <Continuous>    MEDICATIONS  (PRN):    Allergies    gentamicin (Hives)  Levaquin (Seizure)    Intolerances        REVIEW OF SYSTEMS  [x] All review of systems negative except for those marked.  Systemic:	[] Fever		[] Chills		[] Night sweats		[] Fatigue	[] Other  [] Cardiovascular:  [] Pulmonary:  [] Renal/Urologic:  [] Gastrointestinal:  [] Metabolic:  [] Neurologic:  [] Hematologic:  [] ENT:  [] Ophthalmologic:  [] Musculoskeletal: see HPI    Vital Signs Last 24 Hrs  T(C): 36.6 (15 Arya 2019 19:36), Max: 36.6 (15 Arya 2019 14:50)  T(F): 97.9 (15 Arya 2019 19:36), Max: 97.9 (15 Arya 2019 19:36)  HR: 93 (15 Arya 2019 23:06) (86 - 93)  BP: 119/75 (15 Arya 2019 23:06) (99/62 - 127/81)  BP(mean): --  RR: 18 (15 Arya 2019 23:06) (18 - 18)  SpO2: 97% (15 Arya 2019 23:06) (97% - 100%)      PHYSICAL EXAM:  Exam:  Gen: NAD  RLE:  Skin intact  Pos heel strike, pos log roll  Unable to SLR against resistance due to pain   Motor: 5/5 EHL/FHL/TA/Gastrocnemius  Sensory: SILT DP/SP/S/S/T nerve distributions  Vascular: 2+ Dorsalis Pedis pulse                              13.6   6.4   )-----------( 348      ( 14 Jan 2019 18:41 )             38.9     01-14    137  |  99  |  14  ----------------------------<  100<H>  3.8   |  23  |  0.71    Ca    9.3      14 Jan 2019 18:41    TPro  7.2  /  Alb  4.3  /  TBili  0.3  /  DBili  x   /  AST  15  /  ALT  13  /  AlkPhos  36<L>  01-14    PT/INR - ( 14 Jan 2019 18:41 )   PT: 11.4 sec;   INR: 0.99 ratio         PTT - ( 14 Jan 2019 18:41 )  PTT:32.8 sec    CT bony pelvis     IMAGING STUDIES:  NTERPRETATION:  Subtle periosteal reaction of the right lateral proximal   femur as seen on prior xray may represent a stress reaction/fracture.   Further evaluation with MRI can be obtained if clinically indicated.   Bilateral hip calcific tendinitis.    XR R hip/femur  -Calcific tendinosis seen at insertional sight at greater trochanter

## 2019-01-16 NOTE — H&P ADULT - HISTORY OF PRESENT ILLNESS
59 yo woman with no pertinent PMH with exception of C-spine fusion ~ 4 weeks ago presents in setting of right hip pain which began on Friday. Described the pain as soreness present at rest, worsened and more severe upon standing and bearing weight on the right leg. Pain would be unbearable and cause her leg to buckle. She would have to seek assistance of her  to ambulate  in her home. Denies recent trauma. Denies associated numbness/paresthesia/weakness in the right leg. Patient had an xray on 1/14 on her initial visit to the ED where she had an Xray which was initially read a no fracture. Nely was called back when xray was reviewd and found with "Mild cortical thickening in the proximal lateral femoral cortex which is consistent with age-indeterminate stress reaction." With regards to the headache, patient describes it as a bandlike distribution with a component of phono- and photophobia. Lso endorses intermittent dizzines. Denies visual changes, nausea, emesis. Onset of symptoms began on Friday. 59 yo woman with no pertinent PMH with exception of depression/anxiery, C-spine fusion ~ 4 weeks ago presents in setting of right hip pain which began on Friday. Described the pain as soreness present at rest, worsened and more severe upon standing and bearing weight on the right leg. Pain would be unbearable and cause her leg to buckle. She would have to seek assistance of her  to ambulate  in her home. Denies recent trauma. Denies associated numbness/paresthesia/weakness in the right leg. Patient had an xray on 1/14 on her initial visit to the ED where she had an Xray which was initially read a no fracture. Nely was called back when xray was reviewd and found with "Mild cortical thickening in the proximal lateral femoral cortex which is consistent with age-indeterminate stress reaction." With regards to the headache, patient describes it as a bandlike distribution with a component of phono- and photophobia. Lso endorses intermittent dizzines. Denies visual changes, nausea, emesis. Onset of symptoms began on Friday.

## 2019-01-16 NOTE — H&P ADULT - NSHPPHYSICALEXAM_GEN_ALL_CORE
Vital Signs Last 24 Hrs  T(C): 37.4 (16 Jan 2019 05:26), Max: 37.4 (16 Jan 2019 05:26)  T(F): 99.3 (16 Jan 2019 05:26), Max: 99.3 (16 Jan 2019 05:26)  HR: 92 (16 Jan 2019 05:26) (83 - 105)  BP: 99/61 (16 Jan 2019 05:26) (99/61 - 127/81)  BP(mean): --  RR: 18 (16 Jan 2019 05:26) (18 - 18)  SpO2: 96% (16 Jan 2019 05:26) (96% - 100%)

## 2019-01-16 NOTE — H&P ADULT - NEGATIVE NEUROLOGICAL SYMPTOMS
no loss of sensation/no syncope/no loss of consciousness/no transient paralysis/no weakness/no paresthesias

## 2019-01-16 NOTE — DISCHARGE NOTE ADULT - CARE PROVIDERS DIRECT ADDRESSES
[email or web address]

## 2019-01-16 NOTE — ED ADULT NURSE REASSESSMENT NOTE - NS ED NURSE REASSESS COMMENT FT1
Patient at this time appears to be in no acute distress, vital signs are stable. Patient awaiting to be admitted to medicine floor.

## 2019-01-16 NOTE — DISCHARGE NOTE ADULT - HOSPITAL COURSE
59 yo woman with no pertinent PMH with exception of Depression/Anxiery, C-spine fusion ~ 4 weeks ago presents in setting of right hip pain which began on Friday. Described the pain as soreness present at rest, worsened and more severe upon standing and bearing weight on the right leg. Pain would be unbearable and cause her leg to buckle. She would have to seek assistance of her  to ambulate in her home. 59 yo woman with no pertinent PMH with exception of Depression/Anxiery, C-spine fusion ~ 4 weeks ago presents in setting of right hip pain which began on Friday. Described the pain as soreness present at rest, worsened and more severe upon standing and bearing weight on the right leg. Pain would be unbearable and cause her leg to buckle. She would have to seek assistance of her  to ambulate in her home.   Admitted for Hip Fracture and Headache. Patient seen and followed by Ortho 59 yo woman with no pertinent PMH with exception of Depression/Anxiery, C-spine fusion ~ 4 weeks ago presents in setting of right hip pain which began on Friday. Described the pain as soreness present at rest, worsened and more severe upon standing and bearing weight on the right leg. Pain would be unbearable and cause her leg to buckle. She would have to seek assistance of her  to ambulate in her home.   Admitted for Hip Fracture and Headache. Patient seen and followed by Ortho. sp MRI Hip- 59 yo woman with no pertinent PMH with exception of Depression/Anxiery, C-spine fusion ~ 4 weeks ago presents in setting of right hip pain which began on Friday. Described the pain as soreness present at rest, worsened and more severe upon standing and bearing weight on the right leg. Pain would be unbearable and cause her leg to buckle. She would have to seek assistance of her  to ambulate in her home.   Admitted for Hip Fracture and Headache. Patient seen and followed by Ortho. sp MRI Hip- No fracture or proximal femoral stress reaction.  Pt seen by PT, no skilled needs. Discharged home to follow up with PMD 59 yo woman with no pertinent PMH with exception of Depression/Anxiery, C-spine fusion ~ 4 weeks ago presents in setting of right hip pain which began on Friday. Described the pain as soreness present at rest, worsened and more severe upon standing and bearing weight on the right leg. Pain would be unbearable and cause her leg to buckle. She would have to seek assistance of her  to ambulate in her home.   Admitted for Hip Fracture and Headache. Patient seen and followed by Ortho. sp MRI Hip- No fracture or proximal femoral stress reaction.  Pt seen by PT, no skilled needs. Discharged home to follow up with PMD   Discharged patient home.

## 2019-01-16 NOTE — DISCHARGE NOTE ADULT - CARE PLAN
Principal Discharge DX:	Hip fracture, right  Goal:	Pain management  Assessment and plan of treatment:	Pain management  Secondary Diagnosis:	Headache  Assessment and plan of treatment:	Resolved. Principal Discharge DX:	Hip fracture, right  Goal:	MRI with No fracture or proximal femoral stress reaction.  Assessment and plan of treatment:	Follow up with orthopedic MD for management  Secondary Diagnosis:	Headache  Assessment and plan of treatment:	Follow up with PMD , Neurology for management

## 2019-01-16 NOTE — H&P ADULT - NSHPLABSRESULTS_GEN_ALL_CORE
Personally reviewed labs and noted in detail below    < from: Xray Femur 2 Views, Right (01.16.19 @ 00:38) >  ******PRELIMINARY REPORT******        INTERPRETATION:  Subtle cortical thickening of the proximal right lateral   femur may be secondary to a stress reaction, better visualized on CT from   1/15/2019.  ******PRELIMINARY REPORT******        < end of copied text >    < from: CT Pelvis Bony Only No Cont (01.15.19 @ 18:41) >    ******PRELIMINARY REPORT******        INTERPRETATION:  Subtle periosteal reaction of the right lateral proximal   femur as seen on prior xray may represent a stress reaction/fracture.   Further evaluation with MRI can be obtained if clinically indicated.   Bilateral hip calcific tendinitis.   Discussed with Dr. Alvarenga of orthopedic surgery.  ******PRELIMINARY REPORT******    < end of copied text >    < from: CT Angio Neck w/ IV Cont (01.14.19 @ 20:57) >  IMPRESSION:  Noncontrast head CT:   No acute intracranial hemorrhage, mass effect or evidence of acute   territorial infarct.    CTA head and neck:   No major vessel occlusion, hemodynamically significant stenosis,   dissection or aneurysm.  < end of copied text >

## 2019-01-16 NOTE — H&P ADULT - PROBLEM SELECTOR PLAN 2
CTA Head and Neck reviewed and no appreciable new findings to reveal etiology of headaches. No neurological deficits on exam  Neuro recs reviewed : low suspicion for temporal arteritis, ESR rec to be obtained  Recommend Reglan/Magnesium 1gIV for headache  Recommended avoid opiates to decrease risk of rebound headaches CTA Head and Neck reviewed and no appreciable new findings to reveal etiology of headaches. No neurological deficits on exam  Neuro recs reviewed : low suspicion for temporal arteritis, ESR rec to be obtained  Recommend Reglan/Magnesium 1gIV for headache  Recommended avoid opiates to decrease risk of rebound headaches  Check EKG to eval QTc- Primary day team to f/u

## 2019-01-16 NOTE — CONSULT NOTE ADULT - ASSESSMENT
58y Female with atraumatic R hip pain with suspected occult fracture of R hip .    -pain control  -PT  -NWB RLE until MR obtained   -OOB  -DVT ppx  -dispo plan  -Ortho will follow 58y Female with atraumatic R hip pain with suspected occult fracture of R hip .    -pain control  -PT  -NWB RLE until MR obtained   -OOB  -DVT ppx  -dispo plan  -FU MR hip  -Ortho will follow

## 2019-01-17 VITALS
HEART RATE: 97 BPM | TEMPERATURE: 99 F | SYSTOLIC BLOOD PRESSURE: 104 MMHG | DIASTOLIC BLOOD PRESSURE: 66 MMHG | RESPIRATION RATE: 18 BRPM | OXYGEN SATURATION: 99 %

## 2019-01-17 LAB
ANION GAP SERPL CALC-SCNC: 16 MMOL/L — SIGNIFICANT CHANGE UP (ref 5–17)
BUN SERPL-MCNC: 11 MG/DL — SIGNIFICANT CHANGE UP (ref 7–23)
CALCIUM SERPL-MCNC: 9.1 MG/DL — SIGNIFICANT CHANGE UP (ref 8.4–10.5)
CHLORIDE SERPL-SCNC: 101 MMOL/L — SIGNIFICANT CHANGE UP (ref 96–108)
CO2 SERPL-SCNC: 20 MMOL/L — LOW (ref 22–31)
CREAT SERPL-MCNC: 0.65 MG/DL — SIGNIFICANT CHANGE UP (ref 0.5–1.3)
GLUCOSE SERPL-MCNC: 78 MG/DL — SIGNIFICANT CHANGE UP (ref 70–99)
HCT VFR BLD CALC: 37.7 % — SIGNIFICANT CHANGE UP (ref 34.5–45)
HGB BLD-MCNC: 12.7 G/DL — SIGNIFICANT CHANGE UP (ref 11.5–15.5)
MAGNESIUM SERPL-MCNC: 1.8 MG/DL — SIGNIFICANT CHANGE UP (ref 1.6–2.6)
MCHC RBC-ENTMCNC: 30.4 PG — SIGNIFICANT CHANGE UP (ref 27–34)
MCHC RBC-ENTMCNC: 33.7 GM/DL — SIGNIFICANT CHANGE UP (ref 32–36)
MCV RBC AUTO: 90.2 FL — SIGNIFICANT CHANGE UP (ref 80–100)
PLATELET # BLD AUTO: 348 K/UL — SIGNIFICANT CHANGE UP (ref 150–400)
POTASSIUM SERPL-MCNC: 4 MMOL/L — SIGNIFICANT CHANGE UP (ref 3.5–5.3)
POTASSIUM SERPL-SCNC: 4 MMOL/L — SIGNIFICANT CHANGE UP (ref 3.5–5.3)
RBC # BLD: 4.18 M/UL — SIGNIFICANT CHANGE UP (ref 3.8–5.2)
RBC # FLD: 12.6 % — SIGNIFICANT CHANGE UP (ref 10.3–14.5)
SODIUM SERPL-SCNC: 137 MMOL/L — SIGNIFICANT CHANGE UP (ref 135–145)
WBC # BLD: 5.95 K/UL — SIGNIFICANT CHANGE UP (ref 3.8–10.5)
WBC # FLD AUTO: 5.95 K/UL — SIGNIFICANT CHANGE UP (ref 3.8–10.5)

## 2019-01-17 PROCEDURE — 76377 3D RENDER W/INTRP POSTPROCES: CPT

## 2019-01-17 PROCEDURE — 73552 X-RAY EXAM OF FEMUR 2/>: CPT

## 2019-01-17 PROCEDURE — 85027 COMPLETE CBC AUTOMATED: CPT

## 2019-01-17 PROCEDURE — 96375 TX/PRO/DX INJ NEW DRUG ADDON: CPT

## 2019-01-17 PROCEDURE — 86850 RBC ANTIBODY SCREEN: CPT

## 2019-01-17 PROCEDURE — 93005 ELECTROCARDIOGRAM TRACING: CPT

## 2019-01-17 PROCEDURE — 85652 RBC SED RATE AUTOMATED: CPT

## 2019-01-17 PROCEDURE — 96374 THER/PROPH/DIAG INJ IV PUSH: CPT

## 2019-01-17 PROCEDURE — 86901 BLOOD TYPING SEROLOGIC RH(D): CPT

## 2019-01-17 PROCEDURE — 80048 BASIC METABOLIC PNL TOTAL CA: CPT

## 2019-01-17 PROCEDURE — 96376 TX/PRO/DX INJ SAME DRUG ADON: CPT

## 2019-01-17 PROCEDURE — 83735 ASSAY OF MAGNESIUM: CPT

## 2019-01-17 PROCEDURE — 99285 EMERGENCY DEPT VISIT HI MDM: CPT | Mod: 25

## 2019-01-17 PROCEDURE — 97161 PT EVAL LOW COMPLEX 20 MIN: CPT

## 2019-01-17 PROCEDURE — 85730 THROMBOPLASTIN TIME PARTIAL: CPT

## 2019-01-17 PROCEDURE — 73721 MRI JNT OF LWR EXTRE W/O DYE: CPT

## 2019-01-17 PROCEDURE — 85610 PROTHROMBIN TIME: CPT

## 2019-01-17 PROCEDURE — 99239 HOSP IP/OBS DSCHRG MGMT >30: CPT

## 2019-01-17 PROCEDURE — 72192 CT PELVIS W/O DYE: CPT

## 2019-01-17 PROCEDURE — 86140 C-REACTIVE PROTEIN: CPT

## 2019-01-17 PROCEDURE — 86900 BLOOD TYPING SEROLOGIC ABO: CPT

## 2019-01-17 RX ADMIN — Medication 30 MILLIGRAM(S): at 05:26

## 2019-01-17 RX ADMIN — Medication 30 MILLIGRAM(S): at 06:26

## 2019-01-17 NOTE — PHYSICAL THERAPY INITIAL EVALUATION ADULT - ADDITIONAL COMMENTS
Pt was independent with all aspects of functional mobility and ADL's. Pt lives in  with , 18 total steps with a HR.

## 2019-01-17 NOTE — PROGRESS NOTE ADULT - SUBJECTIVE AND OBJECTIVE BOX
Patient is a 58y old  Female who presents with a chief complaint of right hip pain and headache (16 Jan 2019 16:09)      SUBJECTIVE / OVERNIGHT EVENTS: Patient feels ok, anxious to go home.   ROS other wise negative.   No events over night.     T(C): 37.2 (01-17-19 @ 09:20), Max: 37.2 (01-17-19 @ 09:20)  HR: 97 (01-17-19 @ 09:20) (92 - 97)  BP: 104/66 (01-17-19 @ 09:20) (104/66 - 118/68)  RR: 18 (01-17-19 @ 09:20) (18 - 18)  SpO2: 99% (01-17-19 @ 09:20) (98% - 99%)      MEDICATIONS  (STANDING):  buPROPion XL . 150 milliGRAM(s) Oral daily  enoxaparin Injectable 40 milliGRAM(s) SubCutaneous every 24 hours  PARoxetine 20 milliGRAM(s) Oral daily    MEDICATIONS  (PRN):  acetaminophen   Tablet .. 650 milliGRAM(s) Oral every 6 hours PRN Mild Pain (1 - 3)  ketorolac   Injectable 30 milliGRAM(s) IV Push every 8 hours PRN Moderate to Severe Pain  metoclopramide Injectable 10 milliGRAM(s) IV Push every 8 hours PRN headache    PHYSICAL EXAM:  GENERAL: NAD, well-developed  HEAD:  Atraumatic, Normocephalic  EYES: EOMI, conjunctiva and sclera clear  NECK: Supple, No JVD  CHEST/LUNG: Clear to auscultation bilaterally; No wheeze  HEART: Regular rate and rhythm; No murmurs, rubs, or gallops  ABDOMEN: Soft, Nontender, Nondistended; Bowel sounds present  EXTREMITIES:  2+ Peripheral Pulses, No clubbing, cyanosis, or edema  PSYCH: AAOx3  NEUROLOGY: non-focal  SKIN: No rashes or lesions                                   12.7   5.95  )-----------( 348      ( 17 Jan 2019 06:58 )             37.7             PT/INR - ( 16 Jan 2019 01:46 )   PT: 11.7 sec;   INR: 1.03 ratio         PTT - ( 16 Jan 2019 01:46 )  PTT:30.3 sec  137|101|11<78  4.0|20|0.65  9.1,1.8,--  01-17 @ 06:09        RADIOLOGY & ADDITIONAL TESTS:    Imaging Personally Reviewed: reviewed x rays, CTA head and neck, MRI     Consultant(s) Notes Reviewed:  Ortho     Care Discussed with Consultants/Other Providers:

## 2019-01-17 NOTE — PHYSICAL THERAPY INITIAL EVALUATION ADULT - ACTIVE RANGE OF MOTION EXAMINATION, REHAB EVAL
bilateral lower extremity Active ROM was WNL (within normal limits)/sarah. upper extremity Active ROM was WNL (within normal limits)

## 2019-01-17 NOTE — PROGRESS NOTE ADULT - PROBLEM SELECTOR PLAN 2
resolved, likely tension headache, CTA Head and Neck reviewed and no appreciable new findings to reveal etiology of headaches. No neurological deficits on exam

## 2019-01-17 NOTE — CHART NOTE - NSCHARTNOTEFT_GEN_A_CORE
Pt medically cleared by Ortho and Dr. Larose for discharge. Continue current medications, follow up with PMD and ortho.

## 2019-01-17 NOTE — PHYSICAL THERAPY INITIAL EVALUATION ADULT - PERTINENT HX OF CURRENT PROBLEM, REHAB EVAL
Pt is a 58F with PMH of C2-C7 fusion at [facility] (12/2018) who presents complaining of atraumatic R hip pain. Patient initially presented yesterday complaining of about 5 days of atraumatic R hip pain exacerbated by ROM and worse when bearing weight. CT, MRI, and Xray of R Hip all negative.

## 2019-01-17 NOTE — PROGRESS NOTE ADULT - PROBLEM SELECTOR PLAN 1
Reviewed X ray of hip of 1/14. Reviewed prelim reports of X ray of right femur and CT pelvis.  MRI pelvis show no fracture, b/l hip degenerative changes.   Orthopedic consulted.   PT recommend out patient PT.   Pain control, PRN  D/C planning, time spent coordinating discharge plan 43 minutes.

## 2019-01-23 ENCOUNTER — EMERGENCY (EMERGENCY)
Facility: HOSPITAL | Age: 59
LOS: 1 days | Discharge: HOME | End: 2019-01-23
Attending: EMERGENCY MEDICINE | Admitting: EMERGENCY MEDICINE
Payer: COMMERCIAL

## 2019-01-23 VITALS
HEART RATE: 77 BPM | TEMPERATURE: 98 F | SYSTOLIC BLOOD PRESSURE: 127 MMHG | RESPIRATION RATE: 17 BRPM | OXYGEN SATURATION: 99 % | DIASTOLIC BLOOD PRESSURE: 68 MMHG

## 2019-01-23 VITALS
RESPIRATION RATE: 16 BRPM | DIASTOLIC BLOOD PRESSURE: 82 MMHG | SYSTOLIC BLOOD PRESSURE: 123 MMHG | WEIGHT: 141.98 LBS | HEIGHT: 65 IN | OXYGEN SATURATION: 96 % | TEMPERATURE: 98 F | HEART RATE: 83 BPM

## 2019-01-23 DIAGNOSIS — Z98.890 OTHER SPECIFIED POSTPROCEDURAL STATES: Chronic | ICD-10-CM

## 2019-01-23 LAB
ALBUMIN SERPL ELPH-MCNC: 4 G/DL — SIGNIFICANT CHANGE UP (ref 3.3–5)
ALP SERPL-CCNC: 36 U/L — LOW (ref 40–120)
ALT FLD-CCNC: 36 U/L — SIGNIFICANT CHANGE UP (ref 12–78)
ANION GAP SERPL CALC-SCNC: 8 MMOL/L — SIGNIFICANT CHANGE UP (ref 5–17)
APPEARANCE UR: CLEAR — SIGNIFICANT CHANGE UP
AST SERPL-CCNC: 28 U/L — SIGNIFICANT CHANGE UP (ref 15–37)
BACTERIA # UR AUTO: ABNORMAL
BASOPHILS # BLD AUTO: 0.04 K/UL — SIGNIFICANT CHANGE UP (ref 0–0.2)
BASOPHILS NFR BLD AUTO: 0.3 % — SIGNIFICANT CHANGE UP (ref 0–2)
BILIRUB SERPL-MCNC: 0.5 MG/DL — SIGNIFICANT CHANGE UP (ref 0.2–1.2)
BILIRUB UR-MCNC: NEGATIVE — SIGNIFICANT CHANGE UP
BUN SERPL-MCNC: 9 MG/DL — SIGNIFICANT CHANGE UP (ref 7–23)
CALCIUM SERPL-MCNC: 9.3 MG/DL — SIGNIFICANT CHANGE UP (ref 8.5–10.1)
CHLORIDE SERPL-SCNC: 104 MMOL/L — SIGNIFICANT CHANGE UP (ref 96–108)
CO2 SERPL-SCNC: 28 MMOL/L — SIGNIFICANT CHANGE UP (ref 22–31)
COLOR SPEC: SIGNIFICANT CHANGE UP
CREAT SERPL-MCNC: 0.67 MG/DL — SIGNIFICANT CHANGE UP (ref 0.5–1.3)
DIFF PNL FLD: ABNORMAL
EOSINOPHIL # BLD AUTO: 0.04 K/UL — SIGNIFICANT CHANGE UP (ref 0–0.5)
EOSINOPHIL NFR BLD AUTO: 0.3 % — SIGNIFICANT CHANGE UP (ref 0–6)
EPI CELLS # UR: SIGNIFICANT CHANGE UP
FLU A RESULT: SIGNIFICANT CHANGE UP
FLU A RESULT: SIGNIFICANT CHANGE UP
FLUAV AG NPH QL: SIGNIFICANT CHANGE UP
FLUBV AG NPH QL: SIGNIFICANT CHANGE UP
GLUCOSE SERPL-MCNC: 101 MG/DL — HIGH (ref 70–99)
GLUCOSE UR QL: NEGATIVE — SIGNIFICANT CHANGE UP
HCT VFR BLD CALC: 38.7 % — SIGNIFICANT CHANGE UP (ref 34.5–45)
HGB BLD-MCNC: 13.2 G/DL — SIGNIFICANT CHANGE UP (ref 11.5–15.5)
IMM GRANULOCYTES NFR BLD AUTO: 0.3 % — SIGNIFICANT CHANGE UP (ref 0–1.5)
KETONES UR-MCNC: NEGATIVE — SIGNIFICANT CHANGE UP
LEUKOCYTE ESTERASE UR-ACNC: NEGATIVE — SIGNIFICANT CHANGE UP
LYMPHOCYTES # BLD AUTO: 17 % — SIGNIFICANT CHANGE UP (ref 13–44)
LYMPHOCYTES # BLD AUTO: 2.15 K/UL — SIGNIFICANT CHANGE UP (ref 1–3.3)
MAGNESIUM SERPL-MCNC: 2 MG/DL — SIGNIFICANT CHANGE UP (ref 1.6–2.6)
MCHC RBC-ENTMCNC: 30.3 PG — SIGNIFICANT CHANGE UP (ref 27–34)
MCHC RBC-ENTMCNC: 34.1 GM/DL — SIGNIFICANT CHANGE UP (ref 32–36)
MCV RBC AUTO: 88.8 FL — SIGNIFICANT CHANGE UP (ref 80–100)
MONOCYTES # BLD AUTO: 0.68 K/UL — SIGNIFICANT CHANGE UP (ref 0–0.9)
MONOCYTES NFR BLD AUTO: 5.4 % — SIGNIFICANT CHANGE UP (ref 2–14)
NEUTROPHILS # BLD AUTO: 9.73 K/UL — HIGH (ref 1.8–7.4)
NEUTROPHILS NFR BLD AUTO: 76.7 % — SIGNIFICANT CHANGE UP (ref 43–77)
NITRITE UR-MCNC: NEGATIVE — SIGNIFICANT CHANGE UP
PH UR: 8 — SIGNIFICANT CHANGE UP (ref 5–8)
PHOSPHATE SERPL-MCNC: 2.5 MG/DL — SIGNIFICANT CHANGE UP (ref 2.5–4.5)
PLATELET # BLD AUTO: 397 K/UL — SIGNIFICANT CHANGE UP (ref 150–400)
POTASSIUM SERPL-MCNC: 3.6 MMOL/L — SIGNIFICANT CHANGE UP (ref 3.5–5.3)
POTASSIUM SERPL-SCNC: 3.6 MMOL/L — SIGNIFICANT CHANGE UP (ref 3.5–5.3)
PROT SERPL-MCNC: 7.5 G/DL — SIGNIFICANT CHANGE UP (ref 6–8.3)
PROT UR-MCNC: NEGATIVE — SIGNIFICANT CHANGE UP
RBC # BLD: 4.36 M/UL — SIGNIFICANT CHANGE UP (ref 3.8–5.2)
RBC # FLD: 13.2 % — SIGNIFICANT CHANGE UP (ref 10.3–14.5)
RBC CASTS # UR COMP ASSIST: SIGNIFICANT CHANGE UP /HPF (ref 0–4)
RSV RESULT: SIGNIFICANT CHANGE UP
RSV RNA RESP QL NAA+PROBE: SIGNIFICANT CHANGE UP
SODIUM SERPL-SCNC: 140 MMOL/L — SIGNIFICANT CHANGE UP (ref 135–145)
SP GR SPEC: 1 — LOW (ref 1.01–1.02)
UROBILINOGEN FLD QL: NEGATIVE — SIGNIFICANT CHANGE UP
WBC # BLD: 12.68 K/UL — HIGH (ref 3.8–10.5)
WBC # FLD AUTO: 12.68 K/UL — HIGH (ref 3.8–10.5)
WBC UR QL: SIGNIFICANT CHANGE UP

## 2019-01-23 PROCEDURE — 81001 URINALYSIS AUTO W/SCOPE: CPT

## 2019-01-23 PROCEDURE — 83735 ASSAY OF MAGNESIUM: CPT

## 2019-01-23 PROCEDURE — 96374 THER/PROPH/DIAG INJ IV PUSH: CPT

## 2019-01-23 PROCEDURE — 85027 COMPLETE CBC AUTOMATED: CPT

## 2019-01-23 PROCEDURE — 36415 COLL VENOUS BLD VENIPUNCTURE: CPT

## 2019-01-23 PROCEDURE — 84100 ASSAY OF PHOSPHORUS: CPT

## 2019-01-23 PROCEDURE — 87631 RESP VIRUS 3-5 TARGETS: CPT

## 2019-01-23 PROCEDURE — 99285 EMERGENCY DEPT VISIT HI MDM: CPT

## 2019-01-23 PROCEDURE — 80053 COMPREHEN METABOLIC PANEL: CPT

## 2019-01-23 PROCEDURE — 99284 EMERGENCY DEPT VISIT MOD MDM: CPT | Mod: 25

## 2019-01-23 PROCEDURE — 93005 ELECTROCARDIOGRAM TRACING: CPT

## 2019-01-23 RX ORDER — METHOCARBAMOL 500 MG/1
2 TABLET, FILM COATED ORAL
Qty: 30 | Refills: 0
Start: 2019-01-23 | End: 2019-01-27

## 2019-01-23 RX ORDER — ACETAMINOPHEN 500 MG
650 TABLET ORAL ONCE
Qty: 0 | Refills: 0 | Status: COMPLETED | OUTPATIENT
Start: 2019-01-23 | End: 2019-01-23

## 2019-01-23 RX ORDER — SODIUM CHLORIDE 9 MG/ML
3 INJECTION INTRAMUSCULAR; INTRAVENOUS; SUBCUTANEOUS ONCE
Qty: 0 | Refills: 0 | Status: COMPLETED | OUTPATIENT
Start: 2019-01-23 | End: 2019-01-23

## 2019-01-23 RX ORDER — METHOCARBAMOL 500 MG/1
1500 TABLET, FILM COATED ORAL ONCE
Qty: 0 | Refills: 0 | Status: COMPLETED | OUTPATIENT
Start: 2019-01-23 | End: 2019-01-23

## 2019-01-23 RX ORDER — METOCLOPRAMIDE HCL 10 MG
10 TABLET ORAL ONCE
Qty: 0 | Refills: 0 | Status: COMPLETED | OUTPATIENT
Start: 2019-01-23 | End: 2019-01-23

## 2019-01-23 RX ORDER — SODIUM CHLORIDE 9 MG/ML
1000 INJECTION INTRAMUSCULAR; INTRAVENOUS; SUBCUTANEOUS ONCE
Qty: 0 | Refills: 0 | Status: COMPLETED | OUTPATIENT
Start: 2019-01-23 | End: 2019-01-23

## 2019-01-23 RX ADMIN — Medication 10 MILLIGRAM(S): at 16:29

## 2019-01-23 RX ADMIN — SODIUM CHLORIDE 1000 MILLILITER(S): 9 INJECTION INTRAMUSCULAR; INTRAVENOUS; SUBCUTANEOUS at 16:29

## 2019-01-23 RX ADMIN — SODIUM CHLORIDE 3 MILLILITER(S): 9 INJECTION INTRAMUSCULAR; INTRAVENOUS; SUBCUTANEOUS at 16:24

## 2019-01-23 RX ADMIN — Medication 650 MILLIGRAM(S): at 16:29

## 2019-01-23 RX ADMIN — METHOCARBAMOL 1500 MILLIGRAM(S): 500 TABLET, FILM COATED ORAL at 16:41

## 2019-01-23 NOTE — ED PROVIDER NOTE - NSFOLLOWUPINSTRUCTIONS_ED_ALL_ED_FT
1. FOLLOW UP WITH YOUR DOCTOR IN 24-48 HOURS.  2. FOLLOW UP WITH ALL SPECIALIST DISCUSSED DURING YOUR VISIT.  3. TAKE tylenol every 6 HOURS WITH FOOD AS NEEDED FOR PAIN.  4.IF PAIN PERSISTS, ADD ROBAXIN AS PRESCRIBED. DO NOT DRIVE WHEN TAKING ROBAXIN. RETURN FOR WORSENING SYMPTOMS.  5. USE WARM COMPRESS ON NECK AS NEEDED FOR PAIN  6. BUY OVER THE COUNTER SALONPAS LIDOCAINE PATCHES FOR NECK/ BACK PAIN AND USE AS NEEDED.

## 2019-01-23 NOTE — ED PROVIDER NOTE - PROGRESS NOTE DETAILS
pt improved. no csf leaking since pt came to ed. advised pt to walk around to eval for csf. pt improved. no csf leaking since pt came to ed. advised pt to walk around to eval for csf. chart revieved from [place] without acute findings. no csf leaking from nostrils. pt seen by neuro at [place] ed, dr armando without acute findings. pt advised to follow up out pt. will rx robaxin. advised neuro follow up . All imaging and labs reviewed. all results reviewed with pt including abnormal results. pt given a copy of results. pt advised to follow up with pmd regarding abnormal results. All questions answered and concerns addressed. pt verbalized understanding and agreement with plan and dx. pt advised on next step and when/where to follow up. pt advised on all take home and otc medications. pt advised to follow up with PMD. pt advised to return to ed for worsenng symptoms including fever, cp, sob. will dc.

## 2019-01-23 NOTE — ED PROVIDER NOTE - CARE PROVIDER_API CALL
Gaurav Santos), Neurology  [address] [place] [id number]  Phone: [phone]  Fax: [phone]

## 2019-01-23 NOTE — ED ADULT TRIAGE NOTE - CHIEF COMPLAINT QUOTE
pt s/p cervical spinal fusion 12/18, pt c/o headache/dizziness with n/v, seen at [place] negative workup, called neurologist today told to come to ED

## 2019-01-23 NOTE — ED ADULT NURSE NOTE - OBJECTIVE STATEMENT
This is a 59 y/o female received ambulatory AXO&4 C/O  dizziness associated with nausea and vomiting pt s/p cervical spinal fusion 12/18. Patient denies any LOC, headache, respiration even unlabored lung field clear bilaterally. abdomen soft nontender nondistended will monitor support and safety maintained.

## 2019-01-23 NOTE — ED PROVIDER NOTE - PHYSICAL EXAMINATION
Neuro- A&Ox3. Speech clear, without articulation or word-finding difficulties. Eyes- PERRL bilaterally. EOMs in tact. No nystagmus. CN II-XII in tact. No facial droop. No sensory or motor deficits throughout extremities bilaterally. Strength 5/5 throughout upper and lower extremities. No pronator drift. Gait stable. finger-to-nose in tact.

## 2019-01-23 NOTE — ED ADULT NURSE NOTE - CHIEF COMPLAINT QUOTE
pt s/p cervical spinal fusion 12/18, pt c/o headache/dizziness with n/v, seen at [place] negative workup, called neurologist today told to come to ED

## 2019-01-23 NOTE — ED PROVIDER NOTE - ATTENDING CONTRIBUTION TO CARE
pt with acute on chronic dizziness s/p c-spine fusion.  Pt was seen at [facility] and by neurosurgery with neg workup.  Pt was scheduled to see neuro today but came to the ED with concern for CSF fluid coming out of her nose.  labs, imaging unremarkable.  mild leucocytosis.  pt dc tof/u with neuro outpt.

## 2020-06-17 NOTE — ED ADULT NURSE NOTE - ISOLATION TYPE:
[facility], [place]    History and Physical - MICU Service   Patient Name: Radha De Souza   Date of Admission:  5/3/2020      Assessment & Plan   Radha De Souza is a 65 year old female with recent prolonged hospitalization 4/2 - 4/25 at [place] for acute cholecystitis s/p cholecystectomy with intraoperative cholangiogram demonstrating retained stone. Subsequent ERCP was c/b severe necrotizing pancreatitis with infected fluid collections (E.coli, VRE, Candida) s/p IR drains. Transferred to [facility] on 5/3 for Panc/Bili consult. Pt underwent EUS guided drainage and cystgastrostomy with 15mm Axios and 2 Solus stents across Axios on 5/6. Now s/p necrosectomy x 4 as well as sinus tract endoscopy (VIKTOR). Patient transferred to the MICU on 6/17 due to worsening hypoxic respiratory failure.      ________________________________________________________________  NEURO/PSYCH    # Pain, well-controlled  - Scheduled               - Tylenol 650mg Q6H               - holding scheduled oxycodone to prevent depressed respiratory dirve               - Oxycodone 2.5 - 5mg Q4H PRN    ________________________________________________________________  RESPIRATORY    # acute hypoxic respiratory failure  # multi-focal infiltrates on CT  Pt with worsening respiratory failure with increasing supplemental O2 requirements and CT imaging with multifocal infiltrates.  Suspect infectious etiology given fevers, rising WBC, elevated CRP and multifocal infiltrates on imaging. CT PE was negative for PE. Also possible is eosinophilic PNA secondary daptomycin. Organizing PNA also possible. Can not exclude pulmonary edema as a contributing factor.  TTE on 6/16 with EF of 60-65% however BNP was elevated, which could make HFpEF a possibility as well.  She is currently on 50% FiO2 HFNC satting 92-94%.  - continue HFNC, goal SpO2 >90%  - repeat ABG  - 40mg IV lasix  - linezolid, levofloxacin, and meropenem.   - ID following,  appreciate recs  - LDH, fungitell, and galactomannan pending  - daily CXR   - daily   ________________________________________________________________  CARDIOVASCULAR    # sinus tachycardia  Likely driven by hypoxia. CT PE was negative.  - telemetry    #QTc prolonging medications  Patient on fluconazole and started on levofloxacin  - daily EKG to monitor QTC  ________________________________________________________________  RENAL    # hypokalemia  K of 2.8 on 6/17  - trend BMP  - electrolyte protocol      # lactic acidosis  Likely hypoxia driven  - repeat lactate    ________________________________________________________________  GASTROINTESTINAL    # Post ERCP necrotizing pancreatitis c/b infected fluid collections   # S/p Cholecystectomy c/b retained choledocholithiasis  - Management per GI, appreciate recs  - ID consulted appreciate recs.    Luis course  4/3 Lap Cathy with + IOC  4/4 ERCP with unsuccessful CBD cannulation, PD stent placed  4/6 IR drain placement into ANC  4/12 Chest tubes   4/13 ERCP, CBD stent  4/28 Drain replacement  4/29 Thoracentesis  [facility] course (transferred on 5/3)  5/6 Endoscopic cystgastrostomy placement  5/8 IR upsize of perc drains to 20F and 24F  5/12 EGD with necrosectomy + PEG-J placement (axios remains)  5/19 EGD with necrosectomy + VIKTOR + ERCP (stone removal) (axios removed)  5/27: EGD with necrosectomy (Axios cystgastrectomy replaced)  6/1: EGD with necrosectomy, stent exchange (G tube plugged due to solid necrosis)   6/8: EGD with necrosectomy  6/9: Perc drain exchanged   6/15: EGD with necrosectomy, compass stent placed, replacement of 24f perc tube   Infectious Disease Management  Fluid collections growing E.coli, VRE, candida  Meropenem (5/3-5/4, 6/17- present)  Micafungin (5/3)  Fluconazole (5/4- present)  Zosyn (5/4-6/17)  Linezolid (5/3- 5/8, 6/17 - present)  Daptomycin (5/8 - 6/17)  - Source control               - GI Panc bili following                - IR, WOCN  following                            - s/p 2 R flank drains, RLQ pelvic ascites drain. Now only with                   R 24F flank drain       # Severe Malnutrition  In setting of acute illness above. Pancreatic fecal elastase 5.3  - GI managing PEG-J  - TFs via J port  - Keep G tube open to gravity to drain  - Flush both perc drains 50cc Q6H while awake  - Nutrition/TFs               - TFs per nutrition recommendations                            - Pancreatic enzyme supplementation                            - Sodium bicarb                            - Continue fiber supplementation to thicken stool               - PO intake as tolerated                            - 2 capsules Creon 36 with meals                            - 1-2 capsules Creon 36 with snacks               - Refeeding syndrome                            - Daily K, Mg, Ph, electrolyte replacement protocol     ________________________________________________________________  INFECTIOUS DISEASE      # Post ERCP necrotizing pancreatitis c/b infected fluid collections   # S/p Cholecystectomy c/b retained choledocholithiasis  - management as above in GI section    # concern for HAP  - management as above in respiratory section  ________________________________________________________________  HEMATOLOGY    # subacute on chronic anemia  hgb stable, no active bleeding at this time  - trend CBC    # reactive thrombocytosis  Likely secondary to sepsis  - trend CBC  ________________________________________________________________  ENDOCRINE  No acute issues  ________________________________________________________________  SKIN/MSK  No acute issues  ________________________________________________________________      Lines/Tubes/Drains: PICC,   Diet: Orders Placed This Encounter      Clear Liquid Diet  Fluids: none  GI Prophylaxis: PPI  DVT Prophylaxis: Pneumatic Compression Devices  Code Status: Full Code    Disposition Plan   Admit to the MICU  Entered:  Kennedy Johnson MD  06/17/2020, 3:11 PM     The patient's care was discussed with the Attending Physician, Dr. Perlman.    Kennedy Johnson MD  Internal Medicine, PGY-2  MICU Service  [facility]  Please see sticky note for cross cover information  ______________________________________________________________________    Chief Complaint   Worsening shortness of breath    History is obtained from the patient and electronic health record    History of Present Illness   Radha De Souza is a 65 year old female with recent prolonged hospitalization 4/2 - 4/25 at [place] for acute cholecystitis s/p cholecystectomy with intraoperative cholangiogram demonstrating retained stone. Subsequent ERCP was c/b severe necrotizing pancreatitis with infected fluid collections (E.coli, VRE, Candida) s/p IR drains. Transferred to [facility] on 5/3 for Panc/Bili consult. Pt underwent EUS guided drainage and cystgastrostomy with 15mm Axios and 2 Solus stents across Axios on 5/6. Now s/p necrosectomy x 4 as well as sinus tract endoscopy (VIKTOR).    Patient has developed progressive hypoxic respiratory failure over the past 2 days. Imaging has shown multifocal infiltrates. Her work of breathing has continued to worsen and has had increasing supplemental O2 requirement, and was ultimately transferred to the ICU on 6/17.    Review of Systems   The 10 point Review of Systems is negative other than noted in the HPI.    Past Medical History   - GERD  - Depression  - Cholecystitis s/p cholecystectomy  - Necrotizing pancreatitis complicated by infected fluid collections    Past Surgical History   Past Surgical History:   Procedure Laterality Date     ENDOSCOPIC RETROGRADE CHOLANGIOPANCREATOGRAM, NECROSECTOMY N/A 5/12/2020    Procedure: ENDOSCOPIC  NECROSECTOMY, STENT PLACEMENT, GASTRIC-JEJUNAL FEEDING TUBE PLACEMENT;  Surgeon: Zack Pacheco MD;  Location: [facility]     ENDOSCOPIC RETROGRADE CHOLANGIOPANCREATOGRAPHY, EXCHANGE  TUBE/STENT N/A 5/19/2020    Procedure: ENDOSCOPIC RETROGRADE CHOLANGIOPANCREATOGRAPHY WITH BILE DUCT STENT EXCHANGE;  Surgeon: Jesse Hicks MD;  Location: [facility]     ENDOSCOPIC ULTRASOUND UPPER GASTROINTESTINAL TRACT (GI) N/A 5/6/2020    Procedure: ENDOSCOPIC ULTRASOUND, ESOPHAGOSCOPY / UPPER GASTROINTESTINAL TRACT (GI)with transluminal  drainage-stent placement and percutaneous drain repostioning-- Nasojejunal exchange;  Surgeon: Zack Pacheco MD;  Location: [facility]     ENDOSCOPIC ULTRASOUND, ESOPHAGOSCOPY, GASTROSCOPY, DUODENOSCOPY (EGD), NECROSECTOMY N/A 5/19/2020    Procedure: ESOPHAGOGASTRODUODENOSCOPY WITH NECROSECTOMY, CYSTGASTROSTOMY STENT EXCHANGE AND GASTROJEJUNOSTOMY TUBE EXCHANGE;  Surgeon: Jesse Hicks MD;  [facility]     ENDOSCOPIC ULTRASOUND, ESOPHAGOSCOPY, GASTROSCOPY, DUODENOSCOPY (EGD), NECROSECTOMY N/A 5/27/2020    Procedure: ESOPHAGOGASTRODUODENOSCOPY WITH NECROSECTOMY, PUS REMOVAL, STENT EXCHANGE AND TRACT DILATION;  Surgeon: Guru Bryanna Robles MD;  [facility]     ENDOSCOPIC ULTRASOUND, ESOPHAGOSCOPY, GASTROSCOPY, DUODENOSCOPY (EGD), NECROSECTOMY N/A 6/1/2020    Procedure: ESOPHAGOGASTRODUODENOSCOPY (EGD) with necrosectomy, stent exchange,;  Surgeon: Raul Wilkerson MD;  [facility]     ENDOSCOPIC ULTRASOUND, ESOPHAGOSCOPY, GASTROSCOPY, DUODENOSCOPY (EGD), NECROSECTOMY N/A 6/8/2020    Procedure: ESOPHAGOGASTRODUODENOSCOPY (EGD) with necrosectomy, dilation and stent exchange;  Surgeon: Zack Pacheco MD;  [facility]     ENDOSCOPIC ULTRASOUND, ESOPHAGOSCOPY, GASTROSCOPY, DUODENOSCOPY (EGD), NECROSECTOMY N/A 6/15/2020    Procedure: Upper endoscopy, with dilation, stent placement, necrosectomy and percutaneous tube placement;  Surgeon: Jesse Hicks MD;  [facility]     INSERT TUBE NASOJEJUNOSTOMY  5/6/2020    Procedure: Insert tube nasojejunostomy;  Surgeon: Zack Pacheco MD;  Location[facility]     IR ABSCESS TUBE CHANGE   2020     IR ABSCESS TUBE CHANGE  6/10/2020       Social History   Former smoker   1 etoh drink per month    Family History   Stroke in mom  Lung Cancer in dad    Prior to Admission Medications   Prior to Admission Medications   Prescriptions Last Dose Informant Patient Reported? Taking?   NONFORMULARY 2020 at 0338  Yes Yes   Sig: Wally Da Silva mucopolysaccharide solution. Place 10 mL into mouth every 4 hours as needed for dry mouth.   acetaminophen (TYLENOL) 325 MG tablet Unknown at Unknown  Yes Yes   Si mg by Per Feeding Tube route every 6 hours as needed for mild pain   bisacodyl (DULCOLAX) 10 MG suppository Unknown at Unknown  Yes Yes   Sig: Place 10 mg rectally daily as needed for constipation   calcium carbonate (TUMS) 500 MG chewable tablet 2020 at 1911  Yes Yes   Si chew tab by Per Feeding Tube route every 4 hours as needed for heartburn   melatonin 3 MG tablet 2020 at 2038  Yes Yes   Si mg by Per Feeding Tube route nightly as needed for sleep   omeprazole (PRILOSEC) 2 mg/mL suspension 5/3/2020 at 0503  Yes Yes   Si mg by Per Feeding Tube route once   oxyCODONE (ROXICODONE) 5 MG/5ML solution 5/3/2020 at 0502  Yes Yes   Si-10 mg by Per Feeding Tube route every 4 hours as needed for severe pain   senna-docusate (SENOKOT-S/PERICOLACE) 8.6-50 MG tablet Unknown at Unknown  Yes Yes   Si tablets by Per Feeding Tube route 2 times daily as needed for constipation      Facility-Administered Medications: None       Allergies      Allergies   Allergen Reactions     Bactrim [Sulfamethoxazole W/Trimethoprim] Rash     Pt doesn't remember having allergy, certainly not bad rash or anaphylaxis       Physical Exam   Vital Signs: Temp: 97.8  F (36.6  C) Temp src: Axillary BP: (!) 146/86 Pulse: 134 Heart Rate: 121 Resp: 22 SpO2: 95 % O2 Device: High Flow Nasal Cannula (HFNC) Oxygen Delivery: 4 LPM  Weight: 151 lbs .24 oz      GENERAL: tired but interactive, in mild distress  HEENT:  AT/NC, sclera anicteric, EOMI, HFNC in place  RESP: coarse breath sounds bilaterally  CARDIAC: regular rate and rhythm, no murmurs appreciated  ABDOMEN: Soft, nontender, nondistended. +BS  EXTREMITIES: No LE edema bilaterally  SKIN: Warm and dry, no jaundice or rash  NEURO: tired but interactive, moving all 4 extremities equally      Data     Arterial Blood Gas  Recent Labs   Lab 06/17/20  1129 06/12/20  1355   PH 7.34*  --    PCO2 36  --    PO2 62*  --    HCO3 19*  --    O2PER 4L 21     Venous Blood Gas   Recent Labs   Lab 06/17/20  1129 06/12/20  1355   PHV  --  7.34   PCO2V  --  33*   PO2V  --  35   HCO3V  --  18*   O2PER 4L 21       CMP  Recent Labs   Lab 06/17/20  1340 06/17/20  0544 06/16/20  0442 06/15/20  0620 06/14/20  0348  06/12/20  0637   NA  --  140 136 137 136   < > 136   POTASSIUM 3.9 2.8* 3.4 3.4 3.5   < > 3.2*   CHLORIDE  --  110* 108 108 109   < > 110*   CO2  --  20 21 19* 18*   < > 15*   ANIONGAP  --  10 7 10 9   < > 11   GLC  --  135* 130* 90 117*   < > 134*   BUN  --  13 12 8 9   < > 9   CR  --  1.01 0.94 0.98 0.99   < > 1.07*   GFRESTIMATED  --  59* 65 61 60*   < > 55*   GFRESTBLACK  --  68 75 71 70   < > 64   HAILEY  --  8.1* 8.4* 8.0* 8.0*   < > 8.1*   MAG  --  1.9 2.2 1.9 2.2   < > 1.8   PHOS  --  2.1* 3.0 2.6 2.5   < > 2.5   ALBUMIN  --   --   --   --   --   --  1.4*   BILITOTAL  --  1.5*  --   --   --   --   --    ALKPHOS  --  1,073*  --   --   --   --   --    AST  --  184*  --   --   --   --   --    ALT  --  83*  --   --   --   --   --     < > = values in this interval not displayed.     CBC  Recent Labs   Lab 06/17/20  0544 06/16/20  0442 06/15/20  0620 06/14/20  0348   WBC 13.7* 9.3 16.6* 14.9*   RBC 2.90* 2.70* 2.79* 2.91*   HGB 8.4* 7.9* 8.2* 8.5*   HCT 27.1* 25.5* 26.2* 27.0*   MCV 93 94 94 93   MCH 29.0 29.3 29.4 29.2   MCHC 31.0* 31.0* 31.3* 31.5   RDW 19.9* 19.9* 19.6* 18.6*   * 547* 600* 565*     INR  Recent Labs   Lab 06/17/20  0459 06/14/20  0858 06/11/20  0750   INR 1.14  1.23* 1.38*       Inflammatory Markers    Recent Labs   Lab Test 06/17/20  0459 06/16/20  0442 06/14/20  0348 06/12/20  0637 06/10/20  0648 06/08/20  0744 06/07/20  0951 06/06/20  0744   .0* 280.0* 210.0* 150.0* 150.0* 140.0* 130.0* 120.0*       Immune Globulin Studies  No lab results found.    Microbiology:  Culture Micro   Date Value Ref Range Status   06/17/2020 PENDING  Preliminary   06/12/2020 No growth after 5 days  Preliminary   06/12/2020 No growth after 5 days  Preliminary   06/12/2020 No growth after 5 days  Preliminary   06/12/2020 No growth after 5 days  Preliminary   05/29/2020 No growth  Final   05/21/2020 No growth  Final   05/12/2020 No growth  Final   05/12/2020 No growth  Final   05/12/2020 No growth  Final   05/09/2020 No growth  Final   05/09/2020 No growth  Final   05/04/2020 (A)  Final    Light growth  Escherichia coli  Susceptibility testing done on previous specimen     05/04/2020 (A)  Final    Heavy growth  Enterococcus faecium (VRE)  Susceptibility testing done on previous specimen     05/04/2020   Final    Critical Value/Significant Value, preliminary result only, called to and read back by  Kassi YI) on 4.5.2020 @ 0915, cn.     05/04/2020 Light growth  Escherichia coli   (A)  Final   05/04/2020 Heavy growth  Enterococcus faecium (VRE)   (A)  Final   05/04/2020   Final    Critical Value/Significant Value, preliminary result only, called to and read back by  Kassi YI) on 4.5.2020 @ 0915, cn     05/04/2020   Final    Critical Value/Significant Value called to and read back by  Monique Beck RN 5.6.20 1047. MAX     05/04/2020   Final    Susceptibility testing requested by  Jovan Keith Fellow pager [phone] at 8:30am for add on Daptomycin on Heavy Growth   Enterococcus Faecium (VRE) on 05.07.2020 JT.     05/03/2020 No growth  Final   05/03/2020 No growth  Final     Recent Labs   Lab Test 06/17/20  1341 06/12/20 2022 06/12/20 2014   CULT PENDING No growth  after 5 days No growth after 5 days   SDES Blood Right Arm Blood PICC Blood Right Arm       Urine Studies      Recent Labs   Lab Test 05/09/20  2145   LEUKEST Negative   WBCU 2         Intake/Output Summary (Last 24 hours) at 6/17/2020 1511  Last data filed at 6/17/2020 1500  Gross per 24 hour   Intake 6110.1 ml   Output 4115 ml   Net 1995.1 ml       Imaging:  Recent Results (from the past 24 hour(s))   CT Abdomen w Contrast    Narrative    EXAMINATION: CT ABDOMEN W CONTRAST, 6/17/2020 11:32 AM    TECHNIQUE:  Helical CT images from the lung bases through the  symphysis pubis were obtained with contrast.  Coronal reformatted  images were generated at a workstation for further assessment.    CONTRAST:  92 cc Isovue 370 IV.    COMPARISON: 6/7/2020, 5/31/2020.    HISTORY: Abd pain, gastroenteritis or colitis suspected; Febrile,  white count increasing, check for interval change following  necrosectomy 6/15    FINDINGS:    Abdomen and pelvis:   Sequelae of necrotizing pancreatitis with grossly unchanged size of  the necrotic collection centered on the anterior aspect of the  pancreas extending inferiorly into the right into the right paracolic  gutter, measuring 17.7 x 4.8 cm. There are 3 gastrocystostomy tubes in  place with tips within this largest fluid collection anterior to the  pancreas. Repositioning of the right lateral approach surgical drain  with tip within this collection in the left upper quadrant.    Grossly unchanged size of the collection lateral to the left kidney  extending inferiorly into the left paracolic gutter measuring 10.6 x  7.0 cm in axial dimension. This collection does not appear to  communicate with the larger collection.    Unchanged atrophic appearance of the pancreas. Percutaneous  gastrojejunostomy tube tip in the jejunum.    The spleen, right adrenal gland appear normal. Thickening of the left  adrenal gland is nonspecific and unchanged. Increased density in the  previously fluid  attenuating lesion in the lower pole of the left  kidney when compared to 5/9/2020. This increase in Hounsfield unit  measurement is likely related to artifact or hemorrhage into the cyst.  Mild right-sided hydronephrosis is unchanged, transition at the  ureteral pelvic junction. Cholecystectomy. Minimal intrahepatic  biliary dilation. Normal caliber of the bowel. Small ascites. Mild  atherosclerotic vascular calcifications of the aortoiliac system  without aneurysm.    Lung bases: Consolidative and nodular densities most predominant in  the lower right upper lobe and the lingula. Moderate left pleural  effusion.    Bones and soft tissues: No suspicious osseous lesions.      Impression    IMPRESSION:   1. Sequelae of necrotizing pancreatitis with grossly unchanged  appearance of the necrotic collections with drains in place.  2. New consolidative and nodular densities, most prominent in the  lower right upper lobe and the lingula. Findings are concerning for  infection.  3. Small ascites.  4. Increased density in the previously fluid attenuating lesion in the  lower pole of the left kidney when compared to 5/9/2020. This increase  in Hounsfield unit measurement is likely related to artifact or  hemorrhage into the cyst.    I have personally reviewed the examination and initial interpretation  and I agree with the findings.    GABBI NIEVES MD   CT Chest Pulmonary Embolism w Contrast    Narrative    EXAMINATION: CTA pulmonary angiogram, 6/17/2020 11:46 AM     COMPARISON: CT chest 6/16/2020    HISTORY: PE suspected, high pretest prob    TECHNIQUE: Volumetric helical acquisition of CT images of the chest  from the lung apices to the kidneys were acquired after the  administration of 80 mL of Isovue-370 IV contrast.  Post-processed  multiplanar and/or MIP reformations were obtained, archived to PACS  and used in interpretation of this study.     FINDINGS:    The contrast bolus is adequate. Central filling defects  identified  within the pulmonary arteries. Left upper extremity PICC tip  terminates in the low SVC. The aorta and main pulmonary artery are  normal in caliber. The heart is normal in size. No pericardial  effusion. Multiple enlarged mediastinal lymph nodes are unchanged,  including a 15 mm right paratracheal lymph node (series 9, image 64)  and a 14 mm subcarinal lymph node (series 9, image 105).    Central airways are patent. Moderate left and small right pleural  effusions, not significantly changed in size. Patchy bilateral  consolidations, greatest in the upper lobes, slightly increased in  both lung apices since the previous exam on 6/16/2020.    Limited evaluation the upper abdomen hepatomegaly. Small volume  ascites the upper abdomen. Previously seen pneumobilia in the left  hepatic lobe and the medial right hepatic lobe has resolved.    Bones and soft tissues: No acute or suspicious osseous lesions.  Degenerative changes of the spine.      Impression    IMPRESSION:   1. Exam is negative for acute pulmonary embolism.  2. Patchy bilateral airspace consolidations, greatest in the upper  lobes, minimally increased from the lung apices since the previous  exam on 6/16/2020. Findings are concerning for infection.  3. Stable moderate left and small right pleural effusions.  4. Hepatomegaly and small volume ascites in the upper abdomen.  5. Mildly prominent mediastinal lymph nodes, possibly reactive.      In the event of a positive result for acute pulmonary embolism  resulting in right heart strain, consider calling the   [facility] hospital  for PERT (Pulmonary Embolism Response Team)  Activation?    PERT -- Pulmonary Embolism Response Team (Multidisciplinary team  including cardiology, interventional radiology, critical care,  hematology)    I have personally reviewed the examination and initial interpretation  and I agree with the findings.    TATYANA NUENS MD            None

## 2020-09-14 NOTE — PHYSICAL THERAPY INITIAL EVALUATION ADULT - HEALTH SCREEN CRITERIA
"     Follow Up Note     Date: 2020   Patient Name: Bonilla Sterling Jr.  MRN: [id number]  : 1956     Primary Care Provider: Christin Yeh APRN     Chief Complaint:    Chief Complaint   Patient presents with   • Follow-up     History of present illness:   2020  Bonilla Sterling Jr. is a 64 y.o. male who is here today for follow up after colonoscopy. The patient colonoscopy 2020 with diverticulosis in the sigmoid colon.  Nonbleeding small internal hemorrhoids.  Otherwise normal.  No biopsy.    The patient has a history of heartburn with reflux for many years. The patient takes Omeprazole 20 mg with reasonable control of heartburn. There is no history of difficulty swallowing.     Interval History:  2020    The patient denies recent change in bowel habits. There is no diarrhea or constipation. There is no history of abdominal pain. There is no history of overt GI bleed (hematemesis melena or hematochezia). The patient denies nausea or vomiting. The patient has a long standing history of heartburn that is well controlled with Omeprazole 20 mg.. The patient denies dysphagia or odynophagia. There is no history of recent significant weight loss. There is no history of liver disease in the past. There is no family history of colon cancer. The patient's last colonoscopy was in  and was \"normal\" per patient.    Subjective      Past Medical History:   Diagnosis Date   • Acid reflux    • Anxiety    • Chronic pain disorder     Back    • Elevated cholesterol    • Gout    • Hypertension    • Low back pain    • Wears glasses    • Wears partial dentures     Full upper plate - advised no adhesives DOS     Past Surgical History:   Procedure Laterality Date   • COLONOSCOPY     • COLONOSCOPY N/A 2020    Procedure: COLONOSCOPY;  Surgeon: Chantale Naik MD;  Location: [facility] ENDOSCOPY;  Service: Gastroenterology;  Laterality: N/A;   • MOUTH SURGERY      Oral extractions   • " WRIST FRACTURE SURGERY Right      Family History   Problem Relation Age of Onset   • Diabetes Mother    • Hypertension Mother    • Breast cancer Mother    • Diabetes Father    • Hypertension Father    • No Known Problems Sister    • No Known Problems Brother    • No Known Problems Sister    • No Known Problems Sister    • No Known Problems Sister    • No Known Problems Sister    • No Known Problems Brother    • No Known Problems Brother    • Colon cancer Neg Hx      Social History     Socioeconomic History   • Marital status:      Spouse name: Not on file   • Number of children: Not on file   • Years of education: Not on file   • Highest education level: Not on file   Tobacco Use   • Smoking status: Former Smoker     Packs/day: 1.00     Years: 20.00     Pack years: 20.00     Types: Cigarettes     Quit date:      Years since quittin.7   • Smokeless tobacco: Never Used   Substance and Sexual Activity   • Alcohol use: No     Comment: quit 10 years ago    • Drug use: No   • Sexual activity: Defer       Current Outpatient Medications:   •  acetaminophen (TYLENOL) 500 MG tablet, Take 500 mg by mouth Every 6 (Six) Hours As Needed for Mild Pain ., Disp: , Rfl:   •  allopurinol (ZYLOPRIM) 100 MG tablet, Take 100 mg by mouth Daily., Disp: , Rfl:   •  amitriptyline (ELAVIL) 10 MG tablet, Take 10 mg by mouth Every Night., Disp: , Rfl:   •  atorvastatin (LIPITOR) 20 MG tablet, Take 20 mg by mouth Daily., Disp: , Rfl:   •  cetirizine (zyrTEC) 10 MG tablet, Take 10 mg by mouth Daily., Disp: , Rfl:   •  hydrochlorothiazide (HYDRODIURIL) 25 MG tablet, Take 25 mg by mouth Daily., Disp: , Rfl:   •  lisinopril (PRINIVIL,ZESTRIL) 20 MG tablet, Take 20 mg by mouth 2 (two) times a day., Disp: , Rfl:   •  methocarbamol (ROBAXIN) 750 MG tablet, TAKE ONE TABLET BY MOUTH IN THE EVENING FOR 30 DAYS (Patient taking differently: Take 750 mg by mouth Every Night.), Disp: 30 tablet, Rfl: 0  •  Multiple Vitamins-Minerals (CENTRUM  no SILVER PO), Take 1 tablet by mouth Daily., Disp: , Rfl:   •  nabumetone (RELAFEN) 750 MG tablet, TAKE ONE TABLET BY MOUTH TWICE DAILY (Patient taking differently: Take 750 mg by mouth 2 (Two) Times a Day.), Disp: 60 tablet, Rfl: 2  •  omeprazole (priLOSEC) 20 MG capsule, Take 20 mg by mouth Daily., Disp: , Rfl:   •  venlafaxine XR (EFFEXOR-XR) 37.5 MG 24 hr capsule, Take 37.5 mg by mouth Daily., Disp: , Rfl:   •  vitamin C (ASCORBIC ACID) 500 MG tablet, Take 500 mg by mouth Daily., Disp: , Rfl:      No Known Allergies     Review of Systems   Constitutional: Negative for appetite change and unexpected weight loss.   HENT: Negative for trouble swallowing.    Eyes: Negative for blurred vision.   Respiratory: Negative for choking and chest tightness.    Cardiovascular: Negative for leg swelling.   Gastrointestinal: Negative for abdominal distention, abdominal pain, anal bleeding, blood in stool, constipation, diarrhea, nausea, rectal pain, vomiting, GERD and indigestion.   Endocrine: Negative for polyphagia.   Genitourinary: Negative for hematuria.   Musculoskeletal: Negative for arthralgias and myalgias.   Skin: Negative for rash.   Allergic/Immunologic: Negative for food allergies.   Neurological: Negative for dizziness, syncope and confusion.   Hematological: Does not bruise/bleed easily.   Psychiatric/Behavioral: Negative for depressed mood.      The following portions of the patient's history were reviewed and updated as appropriate: allergies, current medications, past family history, past medical history, past social history, past surgical history and problem list.  Objective     Physical Exam  Constitutional:       General: He is not in acute distress.     Appearance: Normal appearance. He is well-developed. He is not diaphoretic.   HENT:      Head: Normocephalic and atraumatic.      Right Ear: Hearing and external ear normal.      Left Ear: Hearing and external ear normal.      Nose: Nose normal.       "Mouth/Throat:      Mouth: Mucous membranes are not pale, not dry and not cyanotic. No oral lesions.      Pharynx: No oropharyngeal exudate.   Eyes:      General: Lids are normal.         Right eye: No discharge.         Left eye: No discharge.      Conjunctiva/sclera: Conjunctivae normal.   Neck:      Musculoskeletal: Neck supple. No edema.      Thyroid: No thyroid mass or thyromegaly.      Vascular: No JVD.      Trachea: Trachea normal.   Cardiovascular:      Rate and Rhythm: Normal rate and regular rhythm.      Heart sounds: Normal heart sounds and S2 normal. No murmur. No friction rub. No gallop. No S3 sounds.    Pulmonary:      Effort: Pulmonary effort is normal. No respiratory distress.      Breath sounds: Normal breath sounds.   Chest:      Chest wall: No tenderness.   Abdominal:      General: Bowel sounds are normal. There is no distension.      Palpations: Abdomen is not rigid. There is no hepatomegaly, splenomegaly or mass.      Tenderness: There is no abdominal tenderness. There is no guarding or rebound.      Hernia: No hernia is present.   Lymphadenopathy:      Cervical: No cervical adenopathy.      Upper Body:      Left upper body: No supraclavicular adenopathy.   Skin:     Coloration: Skin is not pale.      Findings: No rash.      Nails: There is no clubbing.     Neurological:      General: No focal deficit present.      Mental Status: He is alert and oriented to person, place, and time.      Cranial Nerves: No cranial nerve deficit.      Sensory: No sensory deficit.   Psychiatric:         Mood and Affect: Mood normal.         Speech: Speech normal.         Behavior: Behavior is cooperative.       Vitals:    09/14/20 0934   BP: 147/94   Pulse: 99   Resp: 18   Temp: 96.9 °F (36.1 °C)   Weight: 131 kg (289 lb)   Height: 175.3 cm (69\")     Results Review:   I reviewed the patient's new clinical results.    Lab on 08/13/2020   Component Date Value Ref Range Status   • Reference Lab Report 08/13/2020 See " attachment   Final   • COVID19 08/13/2020 Not Detected  Not Detected - Ref. Range Final      No radiology results for the last 90 days.     Assessment / Plan      1. Gastroesophageal reflux disease, esophagitis presence not specified  The patient has a history of heartburn with reflux >20 years.   Reasonable control with Omeprazole 20 mg.  EGD to rule out Cavazos's.     2. Diverticulosis of colon  Stable.  Encouraged high fiber, low fat diet with liberal water intake.     3. Internal hemorrhoids  Uncomplicated.    Patient Instructions   1. Antireflux measures: Avoid fried, fatty foods, alcohol, chocolate, coffee, tea,  soft drinks, peppermint and spearmint, spicy foods, tomatoes and tomato based foods, onion based foods, and smoking. Other antireflux measures include weight reduction if overweight, avoiding tight clothing around the abdomen, elevating the head of the bed 6 inches with blocks under the head board, and don't drink or eat before going to bed and avoid lying down immediately after meals.  2. Omeprazole 20 mg 1 by mouth in the am 30 minutes before breakfast.  3. High fiber, low fat diet with liberal water intake.   4. Screening colonoscopy in 10 years.  5. Upper endoscopy-EGD: Description of the procedure, risks, benefits, alternatives and options, including nonoperative options, were discussed with the patient in detail. The patient understands and wishes to proceed. He will call back to schedule.  6. COVID-19 testing prior to procedure. The patient will need to self-quarantine after testing until the procedure..    Ben Carter, APRN  9/14/2020    Please note that portions of this note may have been completed with a voice recognition program. Efforts were made to edit the dictations, but occasionally words are mistranscribed.

## 2020-12-23 ENCOUNTER — TRANSCRIPTION ENCOUNTER (OUTPATIENT)
Age: 60
End: 2020-12-23

## 2021-12-20 NOTE — CHART NOTE - NSCHARTNOTEFT_GEN_A_CORE
Patient seen and examined with , Roberto, at bedside. Reviewed H+P from earlier this morning completed by Dr. Chong. Patient is visibly frustrated this AM due to her time in the ER and need to return to the hospital yesterday evening. She reports a mild headache that has improved somewhat since yesterday. She has not slept "more than a few hours" over the past few days. She has continued R hip and leg pain with weakness she associates with the pain. She has full ROM on exam, but is currently on bedrest given concern for possible fracture. Labs personally reviewed, no evidence of anemia or electrolyte abnormalities.     Patient and  initially demanding to leave AMA this morning. Extensive d/w patient and orthopedics at bedside (Dr. James). Risks of leaving AMA and delaying diagnosis were explained, including worsening possible hip fracture and ?possible permanent damage of the RLE. She expressed understanding of these risks and is currently agreeable to stay.    A/P: 57 y/o woman w/ PMH anxiety/depression and recent cervical spine fusion (12/2018) p/w R hip pain w/ CT pelvis concerning for possible R lateral proximal femur fx, also w/ headache.     1) r/o Femur Fracture: d/w ortho and MRI tech. Scheduled for early this afternoon. Will review results; if fracture, patient will go for surgery this evening (will remain NPO for now). Patient is medically optimized for possible surgery.  2) L iliac sclerotic lesion: Incidental finding on CT pelvis. d/w radiology and with patient and her . Part of the bone from this site was taken during cervical spine surgery, likely postoperative changes. Patient given copy of CT results.  3) Headaches: Currently improved. Neuro eval appreciated, suspect ?tension headache. Holding on further imaging at this time. Patient's spine surgeon (Dr. Steen) also aware, according to patient, and does not recommend further urgent imaging.   4) Depression: Resumed home medications (wellbutrin and paxil).    Plan d/w medicine PA (Annette) and ortho (Dr. James).    Min Zarate M.D.  Hospitalist  Pager: [phone] No risk alerts present

## 2022-03-04 ENCOUNTER — NON-APPOINTMENT (OUTPATIENT)
Age: 62
End: 2022-03-04

## 2022-03-04 PROBLEM — F41.9 ANXIETY DISORDER, UNSPECIFIED: Chronic | Status: ACTIVE | Noted: 2019-01-23

## 2022-03-04 PROBLEM — F32.9 MAJOR DEPRESSIVE DISORDER, SINGLE EPISODE, UNSPECIFIED: Chronic | Status: ACTIVE | Noted: 2019-01-23

## 2022-03-05 ENCOUNTER — NON-APPOINTMENT (OUTPATIENT)
Age: 62
End: 2022-03-05

## 2022-03-05 ENCOUNTER — APPOINTMENT (OUTPATIENT)
Dept: INTERNAL MEDICINE | Facility: CLINIC | Age: 62
End: 2022-03-05
Payer: COMMERCIAL

## 2022-03-05 VITALS
OXYGEN SATURATION: 98 % | SYSTOLIC BLOOD PRESSURE: 132 MMHG | RESPIRATION RATE: 14 BRPM | BODY MASS INDEX: 24.49 KG/M2 | WEIGHT: 147 LBS | DIASTOLIC BLOOD PRESSURE: 72 MMHG | HEIGHT: 65 IN | TEMPERATURE: 97.6 F | HEART RATE: 87 BPM

## 2022-03-05 DIAGNOSIS — Z80.49 FAMILY HISTORY OF MALIGNANT NEOPLASM OF OTHER GENITAL ORGANS: ICD-10-CM

## 2022-03-05 DIAGNOSIS — Z78.9 OTHER SPECIFIED HEALTH STATUS: ICD-10-CM

## 2022-03-05 DIAGNOSIS — Z82.69 FAMILY HISTORY OF OTHER DISEASES OF THE MUSCULOSKELETAL SYSTEM AND CONNECTIVE TISSUE: ICD-10-CM

## 2022-03-05 PROCEDURE — 99386 PREV VISIT NEW AGE 40-64: CPT | Mod: 25

## 2022-03-05 PROCEDURE — 93000 ELECTROCARDIOGRAM COMPLETE: CPT

## 2022-03-05 NOTE — PHYSICAL EXAM
[No Acute Distress] : no acute distress [Well Nourished] : well nourished [Well Developed] : well developed [Well-Appearing] : well-appearing [Normal Sclera/Conjunctiva] : normal sclera/conjunctiva [PERRL] : pupils equal round and reactive to light [EOMI] : extraocular movements intact [Normal Outer Ear/Nose] : the outer ears and nose were normal in appearance [Normal Oropharynx] : the oropharynx was normal [No JVD] : no jugular venous distention [Supple] : supple [No Lymphadenopathy] : no lymphadenopathy [Thyroid Normal, No Nodules] : the thyroid was normal and there were no nodules present [No Respiratory Distress] : no respiratory distress  [No Accessory Muscle Use] : no accessory muscle use [Clear to Auscultation] : lungs were clear to auscultation bilaterally [Normal Rate] : normal rate  [Regular Rhythm] : with a regular rhythm [Normal S1, S2] : normal S1 and S2 [No Carotid Bruits] : no carotid bruits [No Murmur] : no murmur heard [No Abdominal Bruit] : a ~M bruit was not heard ~T in the abdomen [No Varicosities] : no varicosities [Pedal Pulses Present] : the pedal pulses are present [No Edema] : there was no peripheral edema [No Palpable Aorta] : no palpable aorta [No Extremity Clubbing/Cyanosis] : no extremity clubbing/cyanosis [Soft] : abdomen soft [Non-distended] : non-distended [Non Tender] : non-tender [No Masses] : no abdominal mass palpated [No HSM] : no HSM [Normal Bowel Sounds] : normal bowel sounds [Normal Posterior Cervical Nodes] : no posterior cervical lymphadenopathy [Normal Anterior Cervical Nodes] : no anterior cervical lymphadenopathy [No CVA Tenderness] : no CVA  tenderness [No Spinal Tenderness] : no spinal tenderness [No Joint Swelling] : no joint swelling [Grossly Normal Strength/Tone] : grossly normal strength/tone [No Rash] : no rash [Coordination Grossly Intact] : coordination grossly intact [No Focal Deficits] : no focal deficits [Normal Gait] : normal gait [Deep Tendon Reflexes (DTR)] : deep tendon reflexes were 2+ and symmetric [Normal Affect] : the affect was normal [Normal Insight/Judgement] : insight and judgment were intact

## 2022-03-22 ENCOUNTER — APPOINTMENT (OUTPATIENT)
Dept: GASTROENTEROLOGY | Facility: CLINIC | Age: 62
End: 2022-03-22
Payer: COMMERCIAL

## 2022-03-22 DIAGNOSIS — R14.0 ABDOMINAL DISTENSION (GASEOUS): ICD-10-CM

## 2022-03-22 DIAGNOSIS — Z12.11 ENCOUNTER FOR SCREENING FOR MALIGNANT NEOPLASM OF COLON: ICD-10-CM

## 2022-03-22 PROCEDURE — 99203 OFFICE O/P NEW LOW 30 MIN: CPT

## 2022-03-22 NOTE — PHYSICAL EXAM
[General Appearance - Alert] : alert [General Appearance - In No Acute Distress] : in no acute distress [Sclera] : the sclera and conjunctiva were normal [PERRL With Normal Accommodation] : pupils were equal in size, round, and reactive to light [Extraocular Movements] : extraocular movements were intact [Outer Ear] : the ears and nose were normal in appearance [Oropharynx] : the oropharynx was normal [Neck Appearance] : the appearance of the neck was normal [Neck Cervical Mass (___cm)] : no neck mass was observed [Jugular Venous Distention Increased] : there was no jugular-venous distention [Thyroid Diffuse Enlargement] : the thyroid was not enlarged [Thyroid Nodule] : there were no palpable thyroid nodules [Auscultation Breath Sounds / Voice Sounds] : lungs were clear to auscultation bilaterally [Full Pulse] : the pedal pulses are present [Edema] : there was no peripheral edema [Bowel Sounds] : normal bowel sounds [Abdomen Soft] : soft [Abdomen Tenderness] : non-tender [] : no hepato-splenomegaly [Abdomen Mass (___ Cm)] : no abdominal mass palpated [FreeTextEntry1] : A female chaperone was present during my exam.

## 2022-03-22 NOTE — ASSESSMENT
[FreeTextEntry1] : 62 yo female in need of repeat colon Ca screening, GERD\par \par \par PLAN\par Colonoscopy with Plenvu, EGD on same day.\par Covid test

## 2022-03-22 NOTE — HISTORY OF PRESENT ILLNESS
[Heartburn] : denies heartburn [Nausea] : denies nausea [Vomiting] : denies vomiting [Diarrhea] : denies diarrhea [Constipation] : denies constipation [Yellow Skin Or Eyes (Jaundice)] : denies jaundice [Abdominal Pain] : denies abdominal pain [Abdominal Swelling] : denies abdominal swelling [Rectal Pain] : denies rectal pain [de-identified] : 60 YO with last colon 2/2015. A hyperplastic polyp was found at that time. She has a h/o GERD and Anxiety . On omeprazole 40mg PRN. She has had 3 neurological surgeries,ACF 2003 lumbar in 2017 and another ?ACF revision 2019. She has no other known medical conditions.No rectal bleeding, CP,SOB, or BRBPR

## 2022-03-28 ENCOUNTER — APPOINTMENT (OUTPATIENT)
Dept: ORTHOPEDIC SURGERY | Facility: CLINIC | Age: 62
End: 2022-03-28
Payer: COMMERCIAL

## 2022-03-28 ENCOUNTER — NON-APPOINTMENT (OUTPATIENT)
Age: 62
End: 2022-03-28

## 2022-03-28 VITALS
WEIGHT: 146 LBS | SYSTOLIC BLOOD PRESSURE: 130 MMHG | BODY MASS INDEX: 24.32 KG/M2 | HEART RATE: 77 BPM | HEIGHT: 65 IN | DIASTOLIC BLOOD PRESSURE: 84 MMHG

## 2022-03-28 DIAGNOSIS — M54.16 RADICULOPATHY, LUMBAR REGION: ICD-10-CM

## 2022-03-28 DIAGNOSIS — M48.07 SPINAL STENOSIS, LUMBOSACRAL REGION: ICD-10-CM

## 2022-03-28 DIAGNOSIS — M43.16 SPONDYLOLISTHESIS, LUMBAR REGION: ICD-10-CM

## 2022-03-28 PROCEDURE — 72110 X-RAY EXAM L-2 SPINE 4/>VWS: CPT

## 2022-03-28 PROCEDURE — 99203 OFFICE O/P NEW LOW 30 MIN: CPT

## 2022-03-28 NOTE — DISCUSSION/SUMMARY
[de-identified] : We discussed further treatment options.  Her main symptomatology appears more related to the left L4 and L5 nerve roots due to foraminal compromise.  We discussed the role of surgery.  I explained this would entail a fusion at this point.  This point I recommend evaluation for selective nerve root blocks.  She will also try the oral steroid taper.  Follow-up afterwards.

## 2022-03-28 NOTE — PHYSICAL EXAM
[de-identified] : Examination of the lumbar spine reveals no midline tenderness palpation, step-offs, or skin lesions.  Healed lumbar incision.  Decreased range of motion with respect to flexion, extension, lateral bending, and rotation. No tenderness to palpation of the sciatic notch. No tenderness palpation of the bilateral greater trochanters. No pain with passive internal/external rotation of the hips. No instability of bilateral lower extremities.  Negative JOSE. Negative straight leg raise bilaterally. No bowstring. Negative femoral stretch. 5 out of 5 iliopsoas, hip abductors, hips adductors, quadriceps, hamstrings, gastrocsoleus, tibialis anterior, extensor hallucis longus, peroneals. Grossly intact sensation to light touch bilateral lower extremities. 1+ patellar and Achilles reflexes. Downgoing Babinski. No clonus. Intact proprioception. Palpable pulses. No skin lesion and no edema on the right and left lower extremities. [de-identified] : AP and flexion-extension views of lumbar spine reveals mild degenerative scoliosis.  Areas of retrolisthesis and slight L3-4 spondylolisthesis\par \par Lumbar MRI reveals evidence of prior decompression.  She does have moderate stenosis at L2-3.  She has significant foraminal stenosis on the left at L4-5 and L5-S1

## 2022-03-28 NOTE — HISTORY OF PRESENT ILLNESS
[de-identified] : Ms. BRE LUGO  is a 61 year old female who presents with 6-8 months of left lumbar and left hamstring and lateral calf pain.  Denies any LE radicular symptoms.  Normal bowel and bladder control.   Denies any recent fevers, chills, sweats, weight loss, or infection.  She had lumbar surgery in 2017 secondary to stenosis and left leg weakness.  She recovered well from that. \par \par The patients past medical history, past surgical history, medications, allergies, and social history were reviewed by me today with the patient and documented accordingly.  In addition, the patient's family history, which is noncontributory to their visit, was also reviewed.\par

## 2022-04-21 LAB — SARS-COV-2 N GENE NPH QL NAA+PROBE: NOT DETECTED

## 2022-04-22 ENCOUNTER — APPOINTMENT (OUTPATIENT)
Dept: GASTROENTEROLOGY | Facility: AMBULATORY MEDICAL SERVICES | Age: 62
End: 2022-04-22
Payer: COMMERCIAL

## 2022-04-22 ENCOUNTER — APPOINTMENT (OUTPATIENT)
Dept: GASTROENTEROLOGY | Facility: AMBULATORY MEDICAL SERVICES | Age: 62
End: 2022-04-22

## 2022-04-22 PROCEDURE — 43239 EGD BIOPSY SINGLE/MULTIPLE: CPT

## 2022-04-22 PROCEDURE — 45380 COLONOSCOPY AND BIOPSY: CPT

## 2022-07-13 ENCOUNTER — TRANSCRIPTION ENCOUNTER (OUTPATIENT)
Age: 62
End: 2022-07-13

## 2022-07-27 ENCOUNTER — APPOINTMENT (OUTPATIENT)
Dept: RADIOLOGY | Facility: CLINIC | Age: 62
End: 2022-07-27

## 2022-07-27 ENCOUNTER — OUTPATIENT (OUTPATIENT)
Dept: OUTPATIENT SERVICES | Facility: HOSPITAL | Age: 62
LOS: 1 days | End: 2022-07-27
Payer: COMMERCIAL

## 2022-07-27 DIAGNOSIS — Z98.890 OTHER SPECIFIED POSTPROCEDURAL STATES: Chronic | ICD-10-CM

## 2022-07-27 DIAGNOSIS — M20.40 OTHER HAMMER TOE(S) (ACQUIRED), UNSPECIFIED FOOT: ICD-10-CM

## 2022-07-27 PROCEDURE — 73630 X-RAY EXAM OF FOOT: CPT

## 2022-07-27 PROCEDURE — 73630 X-RAY EXAM OF FOOT: CPT | Mod: 26,LT

## 2022-08-06 ENCOUNTER — NON-APPOINTMENT (OUTPATIENT)
Age: 62
End: 2022-08-06

## 2022-08-19 ENCOUNTER — APPOINTMENT (OUTPATIENT)
Dept: INTERNAL MEDICINE | Facility: CLINIC | Age: 62
End: 2022-08-19

## 2022-08-19 VITALS
DIASTOLIC BLOOD PRESSURE: 70 MMHG | SYSTOLIC BLOOD PRESSURE: 126 MMHG | RESPIRATION RATE: 14 BRPM | HEIGHT: 65 IN | BODY MASS INDEX: 23.66 KG/M2 | HEART RATE: 95 BPM | WEIGHT: 142 LBS | OXYGEN SATURATION: 98 %

## 2022-08-19 DIAGNOSIS — K21.9 GASTRO-ESOPHAGEAL REFLUX DISEASE W/OUT ESOPHAGITIS: ICD-10-CM

## 2022-08-19 DIAGNOSIS — R53.83 OTHER FATIGUE: ICD-10-CM

## 2022-08-19 PROCEDURE — 99214 OFFICE O/P EST MOD 30 MIN: CPT

## 2022-08-19 RX ORDER — CYANOCOBALAMIN 500 UG/1
500 SPRAY NASAL
Refills: 0 | Status: DISCONTINUED | COMMUNITY
Start: 2022-08-19 | End: 2022-08-19

## 2022-08-19 RX ORDER — PAROXETINE HYDROCHLORIDE 25 MG/1
25 TABLET, FILM COATED, EXTENDED RELEASE ORAL
Refills: 0 | Status: DISCONTINUED | COMMUNITY
End: 2022-08-19

## 2022-08-19 RX ORDER — METHYLPREDNISOLONE 4 MG/1
4 TABLET ORAL
Qty: 1 | Refills: 0 | Status: DISCONTINUED | COMMUNITY
Start: 2022-03-28 | End: 2022-08-19

## 2022-08-19 RX ORDER — BUPROPION HYDROCHLORIDE 150 MG/1
150 TABLET, EXTENDED RELEASE ORAL
Refills: 0 | Status: DISCONTINUED | COMMUNITY
End: 2022-08-19

## 2022-08-19 NOTE — PLAN
[FreeTextEntry1] : Fatigue may be multifactorial in setting of stress, Vitamin b12 deficiency, anxiety/depression etc. Will order routine labs today to r/o underlying pathology, along with vitamin b12, folate, vitamin D, and FSH/LH (per pt request). \par \par Anxiety/depression \par - Pt ran out of her medications that she had been taking previously \par - Continue Wellbutrin and Paxil \par \par Vitamin b12 deficiency \par - Pt has been on Nascobal prescribed from prior doctor. She had been taking it once weekly. Will renew for now, check B12/folate levels today

## 2022-08-19 NOTE — REVIEW OF SYSTEMS
[Fatigue] : fatigue [Negative] : Integumentary [Fever] : no fever [Chills] : no chills [Night Sweats] : no night sweats [Recent Change In Weight] : ~T no recent weight change

## 2022-08-19 NOTE — HISTORY OF PRESENT ILLNESS
[FreeTextEntry1] : medication refill, fatigue  [de-identified] : Patient is a 61 year old female with PMH DARLEEN, anxiety, depression, Vitamin B12 deficiency who presents today for medication renewal. Patient doing well overall; however, she states she has been feeling fatigued lately. She attributes this to a stressful job and running out of some of her medications. She denies any fevers, chills, chest pain, SOB, dizzines, lightheadedness, palpitations, N/V.

## 2022-08-22 LAB
24R-OH-CALCIDIOL SERPL-MCNC: 59 PG/ML
ALBUMIN SERPL ELPH-MCNC: 4.5 G/DL
ALP BLD-CCNC: 43 U/L
ALT SERPL-CCNC: 10 U/L
ANION GAP SERPL CALC-SCNC: 11 MMOL/L
AST SERPL-CCNC: 17 U/L
BASOPHILS # BLD AUTO: 0.07 K/UL
BASOPHILS NFR BLD AUTO: 1 %
BILIRUB SERPL-MCNC: 0.3 MG/DL
BUN SERPL-MCNC: 12 MG/DL
CALCIUM SERPL-MCNC: 9.8 MG/DL
CHLORIDE SERPL-SCNC: 104 MMOL/L
CHOLEST SERPL-MCNC: 272 MG/DL
CO2 SERPL-SCNC: 26 MMOL/L
CREAT SERPL-MCNC: 0.82 MG/DL
EGFR: 82 ML/MIN/1.73M2
EOSINOPHIL # BLD AUTO: 0.09 K/UL
EOSINOPHIL NFR BLD AUTO: 1.3 %
ESTIMATED AVERAGE GLUCOSE: 120 MG/DL
FOLATE SERPL-MCNC: 13.2 NG/ML
FSH SERPL-MCNC: 71.8 IU/L
GLUCOSE SERPL-MCNC: 97 MG/DL
HBA1C MFR BLD HPLC: 5.8 %
HCT VFR BLD CALC: 42.3 %
HDLC SERPL-MCNC: 85 MG/DL
HGB BLD-MCNC: 13.9 G/DL
IMM GRANULOCYTES NFR BLD AUTO: 0.1 %
LDLC SERPL CALC-MCNC: 169 MG/DL
LH SERPL-ACNC: 49 IU/L
LYMPHOCYTES # BLD AUTO: 2.28 K/UL
LYMPHOCYTES NFR BLD AUTO: 33.5 %
MAN DIFF?: NORMAL
MCHC RBC-ENTMCNC: 29.9 PG
MCHC RBC-ENTMCNC: 32.9 GM/DL
MCV RBC AUTO: 91 FL
MONOCYTES # BLD AUTO: 0.53 K/UL
MONOCYTES NFR BLD AUTO: 7.8 %
NEUTROPHILS # BLD AUTO: 3.83 K/UL
NEUTROPHILS NFR BLD AUTO: 56.3 %
NONHDLC SERPL-MCNC: 187 MG/DL
PLATELET # BLD AUTO: 374 K/UL
POTASSIUM SERPL-SCNC: 5.1 MMOL/L
PROT SERPL-MCNC: 6.7 G/DL
RBC # BLD: 4.65 M/UL
RBC # FLD: 12.4 %
SODIUM SERPL-SCNC: 141 MMOL/L
TRIGL SERPL-MCNC: 90 MG/DL
TSH SERPL-ACNC: 2.23 UIU/ML
VIT B12 SERPL-MCNC: 391 PG/ML
WBC # FLD AUTO: 6.81 K/UL

## 2022-10-19 ENCOUNTER — APPOINTMENT (OUTPATIENT)
Dept: ORTHOPEDIC SURGERY | Facility: CLINIC | Age: 62
End: 2022-10-19

## 2022-10-19 VITALS — BODY MASS INDEX: 23.66 KG/M2 | HEIGHT: 65 IN | WEIGHT: 142 LBS

## 2022-10-19 DIAGNOSIS — M47.812 SPONDYLOSIS W/OUT MYELOPATHY OR RADICULOPATHY, CERVICAL REGION: ICD-10-CM

## 2022-10-19 PROCEDURE — 72040 X-RAY EXAM NECK SPINE 2-3 VW: CPT

## 2022-10-19 PROCEDURE — 99214 OFFICE O/P EST MOD 30 MIN: CPT

## 2022-10-19 NOTE — DISCUSSION/SUMMARY
[de-identified] : We discussed further treatment options.  She would like to try some physical therapy.  She also try some medical massage and acupuncture.  Advanced imaging if not improved or worsen.

## 2022-10-19 NOTE — PHYSICAL EXAM
[de-identified] : Examination of the cervical spine reveals no midline or paraspinal tenderness to palpation.  Healed anterior and posterior cervical incisions.  No cervical lymphadenopathy. Decreased range of motion with respect to flexion, extension, rotation, and lateral bending. Negative Spurlings. Negative Lhermitte's. Full range of motion bilateral shoulders without evidence of impingement. No instability of bilateral upper extremities.  Cranial nerves II through XII grossly intact. Intact sensation bilateral upper extremities. 5/5 deltoids biceps triceps wrist extensors wrist flexors finger flexors and hand intrinsics. 1+ biceps triceps and brachioradialis reflexes. Negative Grace's. 2+ radial pulse. Negative Tinel's over the cubital and carpal tunnel. No skin lesions on the right and left upper extremities. [de-identified] : AP lateral cervical x-rays reveals anterior and posterior fusion.  She does have some adjacent segment degeneration.\par \par AP and flexion-extension views of lumbar spine reveals mild degenerative scoliosis.  Areas of retrolisthesis and slight L3-4 spondylolisthesis\par \par Lumbar MRI reveals evidence of prior decompression.  She does have moderate stenosis at L2-3.  She has significant foraminal stenosis on the left at L4-5 and L5-S1

## 2022-10-19 NOTE — HISTORY OF PRESENT ILLNESS
[de-identified] : Ms. BRE LUGO  is a 62 year old female who presents to the office for a follow-up visit.  She has done acupuncture for her back which provides temporary relief.  She also has neck pain (right > left).  She has had an anterior fusion 19 years ago and a posterior fusion 6 years ago. \par \par

## 2022-11-03 ENCOUNTER — APPOINTMENT (OUTPATIENT)
Dept: NEUROLOGY | Facility: CLINIC | Age: 62
End: 2022-11-03

## 2022-11-03 VITALS
WEIGHT: 142 LBS | DIASTOLIC BLOOD PRESSURE: 72 MMHG | BODY MASS INDEX: 23.66 KG/M2 | HEIGHT: 65 IN | HEART RATE: 88 BPM | SYSTOLIC BLOOD PRESSURE: 114 MMHG

## 2022-11-03 DIAGNOSIS — Z81.8 FAMILY HISTORY OF OTHER MENTAL AND BEHAVIORAL DISORDERS: ICD-10-CM

## 2022-11-03 DIAGNOSIS — R41.3 OTHER AMNESIA: ICD-10-CM

## 2022-11-03 PROCEDURE — 99205 OFFICE O/P NEW HI 60 MIN: CPT

## 2022-11-03 RX ORDER — OMEPRAZOLE 40 MG/1
40 CAPSULE, DELAYED RELEASE ORAL
Refills: 0 | Status: DISCONTINUED | COMMUNITY
Start: 2022-08-19 | End: 2022-11-03

## 2022-11-03 NOTE — DISCUSSION/SUMMARY
[FreeTextEntry1] : 62-year-old woman who is here for initial consultation of memory loss including leaving the gas on and while she was in the office patient forgot her cell phone in the exam room.  We will check for reversible causes of dementia including B12 levels now that she is off of supplementation.  We will also check for signs of pernicious anemia as patient has no surgery or dietary restrictions to warrant B12 deficiency in the first place.  Will have her undergo neuropsych evaluation as her Mini-Mental status exam is a 30 out of 30 however patient may have other subtle cognitive impairment.  Patient will follow up with me after the consultation.  Future visits may include MRI of the brain to evaluate for any signs of atrophy and to rule out vascular and lesion as a cause of her memory issues.\par \par I spent the time noted on the day of this patient encounter preparing for, providing and documenting the above E/M service and counseling and educate patient on differential, workup, disease course, and treatment/management. Education was provided to the patient during this encounter. All questions and concerns were answered and addressed in detail. The patient verbalized understanding and agreed to plan. Patient was advised to continue to monitor for neurologic symptoms and to notify my office or go to the nearest emergency room if there are any changes. Any orders/referrals and communications were provided as well. \par Side effects of the above medications were discussed in detail including but not limited to applicable black box warning and teratogenicity as appropriate. \par Patient was advised to bring previous records to my office. \par \par \par

## 2022-11-03 NOTE — PHYSICAL EXAM
[Total Score ___ / 30] : the patient achieved a score of [unfilled] /30 [General Appearance - Alert] : alert [Person] : oriented to person [Cranial Nerves Optic (II)] : visual acuity intact bilaterally,  visual fields full to confrontation, pupils equal round and reactive to light [Cranial Nerves Oculomotor (III)] : extraocular motion intact [Cranial Nerves Vestibulocochlear (VIII)] : hearing was intact bilaterally [Cranial Nerves Accessory (XI - Cranial And Spinal)] : head turning and shoulder shrug symmetric [Motor Tone] : muscle tone was normal in all four extremities [Motor Strength] : muscle strength was normal in all four extremities [Sensation Tactile Decrease] : light touch was intact [Abnormal Walk] : normal gait [Coordination - Dysmetria Impaired Finger-to-Nose Bilateral] : not present [1+] : Patella left 1+

## 2022-11-03 NOTE — HISTORY OF PRESENT ILLNESS
[FreeTextEntry1] : 62-year-old woman is here for initial consultation of memory problems for the past 2 years.  Her previous neurologist evaluated her and thought that the memory loss may be due to stress and anxiety.  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  Patient finds it hard to finish tasks.  Patient has no personality change and has not gotten lost in familiar places and she has no hallucinations.  Patient has a history of back issues and therefore her balance was never great.  Patient states that recently she left the gas on the stove. \par \par Patient has a history of B12 deficiency even though patient had no GI surgery nor does she have a dietary restriction.  Patient used to be on nasal B12 supplementation and while on it her B12 in August was 391.  The B12 supplement has since been discontinued by Dr. King

## 2022-11-07 LAB
25(OH)D3 SERPL-MCNC: 26.1 NG/ML
CRP SERPL-MCNC: <3 MG/L
ERYTHROCYTE [SEDIMENTATION RATE] IN BLOOD BY WESTERGREN METHOD: 18 MM/HR
FOLATE SERPL-MCNC: 14 NG/ML
PCA AB SER QL IF: NORMAL
T PALLIDUM AB SER QL IA: NEGATIVE
T4 SERPL-MCNC: 5.4 UG/DL
THYROGLOB AB SERPL-ACNC: 51.7 IU/ML
THYROPEROXIDASE AB SERPL IA-ACNC: 30.3 IU/ML
TSH SERPL-ACNC: 1.63 UIU/ML
VIT B12 SERPL-MCNC: 336 PG/ML

## 2022-11-08 ENCOUNTER — NON-APPOINTMENT (OUTPATIENT)
Age: 62
End: 2022-11-08

## 2022-11-08 LAB
ANA PAT FLD IF-IMP: ABNORMAL
ANA SER IF-ACNC: ABNORMAL

## 2022-11-09 LAB
COPPER SERPL-MCNC: 114 UG/DL
GASTRIN SERPL-MCNC: 59 PG/ML
IF BLOCK AB SER QL: 1 AU/ML
ZINC SERPL-MCNC: 85 UG/DL

## 2022-11-10 ENCOUNTER — NON-APPOINTMENT (OUTPATIENT)
Age: 62
End: 2022-11-10

## 2022-11-10 LAB
HOMOCYSTEINE LEVEL: 10.8 UMOL/L
METHYLMALONIC ACID LEVEL: 149 NMOL/L

## 2022-11-11 LAB — VIT B1 SERPL-MCNC: 127.1 NMOL/L

## 2022-11-14 LAB
A-TOCOPHEROL VIT E SERPL-MCNC: 12.9 MG/L
AMPA-R ABCBA: NEGATIVE
AMPHIPHYSIN IGG TITR SER IF: NEGATIVE TITER
BETA+GAMMA TOCOPHEROL SERPL-MCNC: 2 MG/L
CASPR2-IGG CBA, S: NEGATIVE
CV2 IGG TITR SER: NEGATIVE TITER
GABA-B ABCBA: NEGATIVE
GAD65 AB SER-MCNC: 0.01 NMOL/L
GLIAL NUC TYPE 1 AB TITR SER: NEGATIVE TITER
HU1 AB TITR SER: NEGATIVE TITER
HU2 AB TITR SER IF: NEGATIVE TITER
HU3 AB TITR SER: NEGATIVE TITER
IGLON5 IFA, S: NEGATIVE
IMMUNOLOGIST REVIEW: NORMAL
LGI1-IGG CBA, S: NEGATIVE
NIF IFA, S: NEGATIVE
NMDA-R ABCBA: NEGATIVE
PCA-1 AB TITR SER: NEGATIVE TITER
PCA-2 AB TITR SER: NEGATIVE TITER
PCA-TR AB TITR SER: NEGATIVE TITER
REFLEX ADDED: NORMAL

## 2023-02-20 ENCOUNTER — APPOINTMENT (OUTPATIENT)
Dept: INTERNAL MEDICINE | Facility: CLINIC | Age: 63
End: 2023-02-20
Payer: COMMERCIAL

## 2023-02-20 VITALS
RESPIRATION RATE: 14 BRPM | HEIGHT: 65 IN | SYSTOLIC BLOOD PRESSURE: 118 MMHG | HEART RATE: 86 BPM | BODY MASS INDEX: 24.32 KG/M2 | TEMPERATURE: 97.8 F | WEIGHT: 146 LBS | DIASTOLIC BLOOD PRESSURE: 72 MMHG | OXYGEN SATURATION: 98 %

## 2023-02-20 PROCEDURE — 99214 OFFICE O/P EST MOD 30 MIN: CPT

## 2023-02-20 RX ORDER — COVID-19 ANTIGEN TEST
KIT MISCELLANEOUS
Qty: 8 | Refills: 0 | Status: DISCONTINUED | COMMUNITY
Start: 2022-01-14

## 2023-02-20 RX ORDER — ESTRADIOL 2 MG/1
2 RING VAGINAL
Qty: 1 | Refills: 0 | Status: ACTIVE | COMMUNITY
Start: 2022-12-19

## 2023-02-20 NOTE — PLAN
[FreeTextEntry1] : Anxiety/depression: controlled on wellbutrin and paxil. Renewals sent \par Vitamin b12 deficiency: levels in November borderline low. Restart nascobal. Will recheck B12 levels at CPE in the 3-6 months \par HLD: check lipid profile. Statin was indicated in August however will recheck fasting lipids and start statin if necessary.

## 2023-02-20 NOTE — HISTORY OF PRESENT ILLNESS
[FreeTextEntry1] : med renewal  [de-identified] : 61 y/o female who presents today for medication renewal. Doing well on Wellbutrin and Paxil. She was told by neurology to restart B12 but has not gotten a renewal on the medication' needs refill sent. She has not been focusing on diet and exercise that much recently but is more open to starting a statin for her cholesterol. She would like to check her cholesterol levels again.

## 2023-02-20 NOTE — PHYSICAL EXAM
[No Edema] : there was no peripheral edema [Soft] : abdomen soft [Non Tender] : non-tender [Non-distended] : non-distended [Normal Bowel Sounds] : normal bowel sounds [Normal] : affect was normal and insight and judgment were intact

## 2023-02-21 LAB
ALBUMIN SERPL ELPH-MCNC: 4.2 G/DL
ALP BLD-CCNC: 52 U/L
ALT SERPL-CCNC: 13 U/L
ANION GAP SERPL CALC-SCNC: 12 MMOL/L
AST SERPL-CCNC: 20 U/L
BILIRUB SERPL-MCNC: 0.3 MG/DL
BUN SERPL-MCNC: 17 MG/DL
CALCIUM SERPL-MCNC: 9.7 MG/DL
CHLORIDE SERPL-SCNC: 101 MMOL/L
CHOLEST SERPL-MCNC: 257 MG/DL
CO2 SERPL-SCNC: 26 MMOL/L
CREAT SERPL-MCNC: 0.82 MG/DL
EGFR: 82 ML/MIN/1.73M2
GLUCOSE SERPL-MCNC: 87 MG/DL
HDLC SERPL-MCNC: 78 MG/DL
LDLC SERPL CALC-MCNC: 168 MG/DL
NONHDLC SERPL-MCNC: 179 MG/DL
POTASSIUM SERPL-SCNC: 4.9 MMOL/L
PROT SERPL-MCNC: 6.7 G/DL
SODIUM SERPL-SCNC: 140 MMOL/L
TRIGL SERPL-MCNC: 57 MG/DL

## 2023-03-20 ENCOUNTER — RX RENEWAL (OUTPATIENT)
Age: 63
End: 2023-03-20

## 2023-03-30 ENCOUNTER — OUTPATIENT (OUTPATIENT)
Dept: OUTPATIENT SERVICES | Facility: HOSPITAL | Age: 63
LOS: 1 days | End: 2023-03-30
Payer: COMMERCIAL

## 2023-03-30 VITALS
WEIGHT: 145.06 LBS | HEART RATE: 82 BPM | DIASTOLIC BLOOD PRESSURE: 70 MMHG | TEMPERATURE: 98 F | OXYGEN SATURATION: 99 % | HEIGHT: 65 IN | SYSTOLIC BLOOD PRESSURE: 131 MMHG | RESPIRATION RATE: 16 BRPM

## 2023-03-30 DIAGNOSIS — M20.42 OTHER HAMMER TOE(S) (ACQUIRED), LEFT FOOT: ICD-10-CM

## 2023-03-30 DIAGNOSIS — Z98.890 OTHER SPECIFIED POSTPROCEDURAL STATES: Chronic | ICD-10-CM

## 2023-03-30 DIAGNOSIS — M21.612 BUNION OF LEFT FOOT: ICD-10-CM

## 2023-03-30 DIAGNOSIS — Z01.818 ENCOUNTER FOR OTHER PREPROCEDURAL EXAMINATION: ICD-10-CM

## 2023-03-30 LAB
ANION GAP SERPL CALC-SCNC: 2 MMOL/L — LOW (ref 5–17)
BASOPHILS # BLD AUTO: 0.04 K/UL — SIGNIFICANT CHANGE UP (ref 0–0.2)
BASOPHILS NFR BLD AUTO: 0.6 % — SIGNIFICANT CHANGE UP (ref 0–2)
BUN SERPL-MCNC: 17 MG/DL — SIGNIFICANT CHANGE UP (ref 7–23)
CALCIUM SERPL-MCNC: 9.3 MG/DL — SIGNIFICANT CHANGE UP (ref 8.5–10.1)
CHLORIDE SERPL-SCNC: 109 MMOL/L — HIGH (ref 96–108)
CO2 SERPL-SCNC: 30 MMOL/L — SIGNIFICANT CHANGE UP (ref 22–31)
CREAT SERPL-MCNC: 0.82 MG/DL — SIGNIFICANT CHANGE UP (ref 0.5–1.3)
EGFR: 82 ML/MIN/1.73M2 — SIGNIFICANT CHANGE UP
EOSINOPHIL # BLD AUTO: 0.07 K/UL — SIGNIFICANT CHANGE UP (ref 0–0.5)
EOSINOPHIL NFR BLD AUTO: 1 % — SIGNIFICANT CHANGE UP (ref 0–6)
GLUCOSE SERPL-MCNC: 82 MG/DL — SIGNIFICANT CHANGE UP (ref 70–99)
HCT VFR BLD CALC: 40.1 % — SIGNIFICANT CHANGE UP (ref 34.5–45)
HGB BLD-MCNC: 13 G/DL — SIGNIFICANT CHANGE UP (ref 11.5–15.5)
IMM GRANULOCYTES NFR BLD AUTO: 0.1 % — SIGNIFICANT CHANGE UP (ref 0–0.9)
LYMPHOCYTES # BLD AUTO: 2.23 K/UL — SIGNIFICANT CHANGE UP (ref 1–3.3)
LYMPHOCYTES # BLD AUTO: 30.8 % — SIGNIFICANT CHANGE UP (ref 13–44)
MCHC RBC-ENTMCNC: 29.2 PG — SIGNIFICANT CHANGE UP (ref 27–34)
MCHC RBC-ENTMCNC: 32.4 GM/DL — SIGNIFICANT CHANGE UP (ref 32–36)
MCV RBC AUTO: 90.1 FL — SIGNIFICANT CHANGE UP (ref 80–100)
MONOCYTES # BLD AUTO: 0.63 K/UL — SIGNIFICANT CHANGE UP (ref 0–0.9)
MONOCYTES NFR BLD AUTO: 8.7 % — SIGNIFICANT CHANGE UP (ref 2–14)
NEUTROPHILS # BLD AUTO: 4.25 K/UL — SIGNIFICANT CHANGE UP (ref 1.8–7.4)
NEUTROPHILS NFR BLD AUTO: 58.8 % — SIGNIFICANT CHANGE UP (ref 43–77)
NRBC # BLD: 0 /100 WBCS — SIGNIFICANT CHANGE UP (ref 0–0)
PLATELET # BLD AUTO: 365 K/UL — SIGNIFICANT CHANGE UP (ref 150–400)
POTASSIUM SERPL-MCNC: 4.6 MMOL/L — SIGNIFICANT CHANGE UP (ref 3.5–5.3)
POTASSIUM SERPL-SCNC: 4.6 MMOL/L — SIGNIFICANT CHANGE UP (ref 3.5–5.3)
RBC # BLD: 4.45 M/UL — SIGNIFICANT CHANGE UP (ref 3.8–5.2)
RBC # FLD: 12.4 % — SIGNIFICANT CHANGE UP (ref 10.3–14.5)
SODIUM SERPL-SCNC: 141 MMOL/L — SIGNIFICANT CHANGE UP (ref 135–145)
WBC # BLD: 7.23 K/UL — SIGNIFICANT CHANGE UP (ref 3.8–10.5)
WBC # FLD AUTO: 7.23 K/UL — SIGNIFICANT CHANGE UP (ref 3.8–10.5)

## 2023-03-30 PROCEDURE — 36415 COLL VENOUS BLD VENIPUNCTURE: CPT

## 2023-03-30 PROCEDURE — 93005 ELECTROCARDIOGRAM TRACING: CPT

## 2023-03-30 PROCEDURE — 85025 COMPLETE CBC W/AUTO DIFF WBC: CPT

## 2023-03-30 PROCEDURE — 80048 BASIC METABOLIC PNL TOTAL CA: CPT

## 2023-03-30 PROCEDURE — G0463: CPT

## 2023-03-30 PROCEDURE — 93010 ELECTROCARDIOGRAM REPORT: CPT

## 2023-03-30 NOTE — H&P PST ADULT - NSANTHOSAYNRD_GEN_A_CORE
No. KRYSTLE screening performed.  STOP BANG Legend: 0-2 = LOW Risk; 3-4 = INTERMEDIATE Risk; 5-8 = HIGH Risk

## 2023-03-30 NOTE — H&P PST ADULT - NSICDXFAMILYHX_GEN_ALL_CORE_FT
FAMILY HISTORY:  Mother  Still living? Unknown  Family history of osteoporosis in mother, Age at diagnosis: Age Unknown

## 2023-03-30 NOTE — H&P PST ADULT - NS HP PST LATEX ALLERGY
[FreeTextEntry1] : Patient has less wearing off (2-2.5hrs) but peak dose psychosis continues. She has paranoid ideations --thinking her  is cheating on her and sees children and people in the house. In the off state, these psychotic symptoms disappear. She is seeing her therapist monthly and had her seroquel raised to 50mg at bedtime. Sleep has been good. Can do all ADLs in the ON state\par \par nonmotor\par +RBD treated with klonopin\par no constipation\par No OH\par + ICDs\par + depression\par \par Meds:\par c/l 25/100 2 tabs 5 times per day (q3hrs)\par comtan 1 tab 5 times per day\par sinemet CR 50/200 1 tab qhs\par seroquel 50mg qhs\par sertaline 50mg\par klonopin 0.5mg qhs\par \par [prior meds\par quetiapine\par requip \par \par Srinath nelson [phone]\par \par  No

## 2023-03-30 NOTE — H&P PST ADULT - PROBLEM SELECTOR PLAN 1
Pre op and chlorhexidine instructions given and explained.  Avoid NSAIDs and OTC supplements.   Patient verbalized understanding  medical consult requested by surgeon -4/3/23 Pre op and chlorhexidine instructions given and explained.  Avoid NSAIDs and OTC supplements.   Patient verbalized understanding  medical consult requested by surgeon -4/3/23  Addendum- repair of left foot flexor tendon rupture on 5/8/2023

## 2023-03-30 NOTE — H&P PST ADULT - ASSESSMENT
61 y/o female presents to PST for scheduled metatarsal joint fusion and hammertoe correction on  4/10/23.

## 2023-03-30 NOTE — H&P PST ADULT - HISTORY OF PRESENT ILLNESS
61 y/o female presents to PST for scheduled metatarsal joint fusion and hammertoe correction on  4/10/23. Patient reports pain to left foot and 4 and 5 th digit deformity creating pain to other toes.

## 2023-03-30 NOTE — H&P PST ADULT - ATTENDING COMMENTS
H&P reviewed. No changes in patient's condition, Optimized to proceed with metatarsal joint fusion and hammertoe correction on  left foot 4/10/23. H&P reviewed. No changes in patient's condition, Optimized to proceed with metatarsal joint fusion and hammertoe correction on  left foot 4/10/23.  Addendum- H&P reviewed no changes in condition.  Optimized for the new procedure to repair left foot flexor tendon on 5/8/2023.

## 2023-04-03 ENCOUNTER — APPOINTMENT (OUTPATIENT)
Dept: INTERNAL MEDICINE | Facility: CLINIC | Age: 63
End: 2023-04-03
Payer: COMMERCIAL

## 2023-04-03 VITALS
TEMPERATURE: 97.9 F | RESPIRATION RATE: 14 BRPM | WEIGHT: 143 LBS | HEIGHT: 65 IN | DIASTOLIC BLOOD PRESSURE: 76 MMHG | SYSTOLIC BLOOD PRESSURE: 118 MMHG | OXYGEN SATURATION: 98 % | HEART RATE: 91 BPM | BODY MASS INDEX: 23.82 KG/M2

## 2023-04-03 PROCEDURE — 99213 OFFICE O/P EST LOW 20 MIN: CPT

## 2023-04-03 NOTE — HISTORY OF PRESENT ILLNESS
[No Pertinent Cardiac History] : no history of aortic stenosis, atrial fibrillation, coronary artery disease, recent myocardial infarction, or implantable device/pacemaker [No Pertinent Pulmonary History] : no history of asthma, COPD, sleep apnea, or smoking [No Adverse Anesthesia Reaction] : no adverse anesthesia reaction in self or family member [(Patient denies any chest pain, claudication, dyspnea on exertion, orthopnea, palpitations or syncope)] : Patient denies any chest pain, claudication, dyspnea on exertion, orthopnea, palpitations or syncope [Good (7-10 METs)] : Good (7-10 METs) [Chronic Anticoagulation] : no chronic anticoagulation [Chronic Kidney Disease] : no chronic kidney disease [Diabetes] : no diabetes [FreeTextEntry1] : left foot surgery  [FreeTextEntry2] : 4/10/23 [FreeTextEntry3] : Dr. Orr  [FreeTextEntry4] : 62 year old female with PMH anxiety, depression, vitamin B12 deficiency, HLD (diet controlled) who presents today for pre-op clearance prior to left foot surgery. Patient is doing well and has no acute complaints.

## 2023-04-03 NOTE — ASSESSMENT
[High Risk Surgery - Intraperitoneal, Intrathoracic or Supringuinal Vascular Procedures] : High Risk Surgery - Intraperitoneal, Intrathoracic or Supringuinal Vascular Procedures - No (0) [Ischemic Heart Disease] : Ischemic Heart Disease - No (0) [Congestive Heart Failure] : Congestive Heart Failure - No (0) [Prior Cerebrovascular Accident or TIA] : Prior Cerebrovascular Accident or TIA - No (0) [Creatinine >= 2mg/dL (1 Point)] : Creatinine >= 2mg/dL - No (0) [Insulin-dependent Diabetic (1 Point)] : Insulin-dependent Diabetic - No (0) [0] : 0 , RCRI Class: I, Risk of Post-Op Cardiac Complications: 3.9%, 95% CI for Risk Estimate: 2.8% - 5.4% [Patient Optimized for Surgery] : Patient optimized for surgery [No Further Testing Recommended] : no further testing recommended [Modify medications prior to procedure] : Modify medications prior to procedure [FreeTextEntry4] : 62 year old female with PMH anxiety, depression, vitamin B12 deficiency, HLD (diet controlled) who presents today for pre-op clearance prior to left foot surgery. Patient is doing well and has no acute complaints. Patient denies any chest pain, palpitations, SOB, orthopnea, PND, LE edema. She has good functional capacity and is able to perform > 4 METs without difficulty. Preop labs have been reviewed and are within acceptable range for surgery. EKG is normal sinus rhythm. Patient is medically optimized for planned procedure.  [FreeTextEntry7] : hold vitamins 7 days prior to surgery

## 2023-04-03 NOTE — PHYSICAL EXAM
[No Edema] : there was no peripheral edema [Soft] : abdomen soft [Non Tender] : non-tender [Non-distended] : non-distended [Normal Bowel Sounds] : normal bowel sounds [Coordination Grossly Intact] : coordination grossly intact [No Focal Deficits] : no focal deficits [Normal Gait] : normal gait [Normal] : affect was normal and insight and judgment were intact

## 2023-04-07 NOTE — ASU PATIENT PROFILE, ADULT - AS SC BRADEN NUTRITION
Rodriguez Veras,   Please call Raciel Green and see if he needs help finding a PCP and mental health services.  He was recently seen in ER for hallucinations and insomnia.   I was listed by the ER as the PCP, but alst sow him on 1/7/2014.  Thank you,    Summer     
(4) excellent

## 2023-04-09 ENCOUNTER — TRANSCRIPTION ENCOUNTER (OUTPATIENT)
Age: 63
End: 2023-04-09

## 2023-04-10 ENCOUNTER — TRANSCRIPTION ENCOUNTER (OUTPATIENT)
Age: 63
End: 2023-04-10

## 2023-04-10 ENCOUNTER — OUTPATIENT (OUTPATIENT)
Dept: OUTPATIENT SERVICES | Facility: HOSPITAL | Age: 63
LOS: 1 days | End: 2023-04-10
Payer: COMMERCIAL

## 2023-04-10 VITALS
HEART RATE: 98 BPM | SYSTOLIC BLOOD PRESSURE: 100 MMHG | OXYGEN SATURATION: 98 % | RESPIRATION RATE: 16 BRPM | DIASTOLIC BLOOD PRESSURE: 52 MMHG

## 2023-04-10 VITALS
OXYGEN SATURATION: 99 % | DIASTOLIC BLOOD PRESSURE: 62 MMHG | SYSTOLIC BLOOD PRESSURE: 106 MMHG | HEIGHT: 65 IN | RESPIRATION RATE: 16 BRPM | WEIGHT: 145.06 LBS | TEMPERATURE: 98 F | HEART RATE: 82 BPM

## 2023-04-10 DIAGNOSIS — M20.42 OTHER HAMMER TOE(S) (ACQUIRED), LEFT FOOT: ICD-10-CM

## 2023-04-10 DIAGNOSIS — Z98.890 OTHER SPECIFIED POSTPROCEDURAL STATES: Chronic | ICD-10-CM

## 2023-04-10 DIAGNOSIS — M21.612 BUNION OF LEFT FOOT: ICD-10-CM

## 2023-04-10 PROCEDURE — 73630 X-RAY EXAM OF FOOT: CPT

## 2023-04-10 PROCEDURE — C1769: CPT

## 2023-04-10 PROCEDURE — 28291 CORRJ HALUX RIGDUS W/IMPLT: CPT | Mod: LT

## 2023-04-10 PROCEDURE — 99261: CPT

## 2023-04-10 PROCEDURE — C1713: CPT

## 2023-04-10 PROCEDURE — 76000 FLUOROSCOPY <1 HR PHYS/QHP: CPT

## 2023-04-10 PROCEDURE — 28110 PART REMOVAL OF METATARSAL: CPT | Mod: LT

## 2023-04-10 PROCEDURE — 73630 X-RAY EXAM OF FOOT: CPT | Mod: 26,LT

## 2023-04-10 PROCEDURE — 28285 REPAIR OF HAMMERTOE: CPT | Mod: T4,XS

## 2023-04-10 PROCEDURE — C1889: CPT

## 2023-04-10 DEVICE — GWIRE TROCAR TIP 1.6X150MM: Type: IMPLANTABLE DEVICE | Site: LEFT | Status: FUNCTIONAL

## 2023-04-10 DEVICE — IMP SYS PEEK FOREFOOT IB: Type: IMPLANTABLE DEVICE | Site: LEFT | Status: FUNCTIONAL

## 2023-04-10 DEVICE — IMPLANTABLE DEVICE: Type: IMPLANTABLE DEVICE | Site: LEFT | Status: FUNCTIONAL

## 2023-04-10 RX ORDER — OXYCODONE HYDROCHLORIDE 5 MG/1
5 TABLET ORAL ONCE
Refills: 0 | Status: DISCONTINUED | OUTPATIENT
Start: 2023-04-10 | End: 2023-04-10

## 2023-04-10 RX ORDER — ACETAMINOPHEN 500 MG
1000 TABLET ORAL ONCE
Refills: 0 | Status: COMPLETED | OUTPATIENT
Start: 2023-04-10 | End: 2023-04-10

## 2023-04-10 RX ORDER — SODIUM CHLORIDE 9 MG/ML
1000 INJECTION, SOLUTION INTRAVENOUS
Refills: 0 | Status: DISCONTINUED | OUTPATIENT
Start: 2023-04-10 | End: 2023-04-10

## 2023-04-10 RX ORDER — HYDROMORPHONE HYDROCHLORIDE 2 MG/ML
0.5 INJECTION INTRAMUSCULAR; INTRAVENOUS; SUBCUTANEOUS
Refills: 0 | Status: DISCONTINUED | OUTPATIENT
Start: 2023-04-10 | End: 2023-04-10

## 2023-04-10 RX ORDER — ONDANSETRON 8 MG/1
4 TABLET, FILM COATED ORAL ONCE
Refills: 0 | Status: DISCONTINUED | OUTPATIENT
Start: 2023-04-10 | End: 2023-04-10

## 2023-04-10 RX ORDER — CEFAZOLIN SODIUM 1 G
2000 VIAL (EA) INJECTION ONCE
Refills: 0 | Status: COMPLETED | OUTPATIENT
Start: 2023-04-10 | End: 2023-04-10

## 2023-04-10 RX ADMIN — Medication 400 MILLIGRAM(S): at 11:55

## 2023-04-10 RX ADMIN — HYDROMORPHONE HYDROCHLORIDE 0.5 MILLIGRAM(S): 2 INJECTION INTRAMUSCULAR; INTRAVENOUS; SUBCUTANEOUS at 11:44

## 2023-04-10 RX ADMIN — SODIUM CHLORIDE 75 MILLILITER(S): 9 INJECTION, SOLUTION INTRAVENOUS at 11:57

## 2023-04-10 RX ADMIN — HYDROMORPHONE HYDROCHLORIDE 0.5 MILLIGRAM(S): 2 INJECTION INTRAMUSCULAR; INTRAVENOUS; SUBCUTANEOUS at 13:11

## 2023-04-10 RX ADMIN — Medication 1000 MILLIGRAM(S): at 12:25

## 2023-04-10 RX ADMIN — SODIUM CHLORIDE 75 MILLILITER(S): 9 INJECTION, SOLUTION INTRAVENOUS at 07:19

## 2023-04-10 NOTE — BRIEF OPERATIVE NOTE - NSICDXBRIEFPOSTOP_GEN_ALL_CORE_FT
POST-OP DIAGNOSIS:  Hallux rigidus, left foot 10-Apr-2023 11:30:04  Pavel Cabral  Hammertoe of left foot 10-Apr-2023 11:30:11  Pavel Cabral  Tailor's bunionette, left 10-Apr-2023 11:30:19  Pavel Cabral

## 2023-04-10 NOTE — BRIEF OPERATIVE NOTE - NSICDXBRIEFPREOP_GEN_ALL_CORE_FT
PRE-OP DIAGNOSIS:  Hallux rigidus, left foot 10-Apr-2023 11:29:32  Pavel Cabral  Hammertoe of left foot 10-Apr-2023 11:29:43  Pavel Cabral  Tailor's bunionette, left 10-Apr-2023 11:29:51  Pavel Cabral

## 2023-04-10 NOTE — ASU DISCHARGE PLAN (ADULT/PEDIATRIC) - CARE PROVIDER_API CALL
Wendy Saldaña (ROSALBA)  Orthopaedic Surgery Surgery  [address] [facility], [address] [place] [address]  Phone: [phone]  Fax: [phone]  Follow Up Time: 1 week

## 2023-04-10 NOTE — ASU DISCHARGE PLAN (ADULT/PEDIATRIC) - NS MD DC FALL RISK RISK
For information on Fall & Injury Prevention, visit: [email or web address] OR  [email or web address] OR  https://www.cdc.gov/steadi/patient.html

## 2023-04-10 NOTE — BRIEF OPERATIVE NOTE - NSICDXBRIEFPROCEDURE_GEN_ALL_CORE_FT
PROCEDURES:  Interpositional arthroplasty of great toe 10-Apr-2023 11:28:47  Pavel Cabral  Correction of tailor's bunion 10-Apr-2023 11:29:01  Pavel Cabral  Correction of hammertoe 10-Apr-2023 11:29:12  Pavel Cabral

## 2023-05-02 PROBLEM — M21.612 BUNION OF LEFT FOOT: Chronic | Status: ACTIVE | Noted: 2023-03-30

## 2023-05-05 RX ORDER — SODIUM CHLORIDE 9 MG/ML
1000 INJECTION, SOLUTION INTRAVENOUS
Refills: 0 | Status: DISCONTINUED | OUTPATIENT
Start: 2023-05-08 | End: 2023-05-08

## 2023-05-07 ENCOUNTER — TRANSCRIPTION ENCOUNTER (OUTPATIENT)
Age: 63
End: 2023-05-07

## 2023-05-08 ENCOUNTER — TRANSCRIPTION ENCOUNTER (OUTPATIENT)
Age: 63
End: 2023-05-08

## 2023-05-08 ENCOUNTER — OUTPATIENT (OUTPATIENT)
Dept: OUTPATIENT SERVICES | Facility: HOSPITAL | Age: 63
LOS: 1 days | End: 2023-05-08
Payer: COMMERCIAL

## 2023-05-08 VITALS
HEART RATE: 69 BPM | RESPIRATION RATE: 16 BRPM | SYSTOLIC BLOOD PRESSURE: 97 MMHG | TEMPERATURE: 99 F | DIASTOLIC BLOOD PRESSURE: 72 MMHG | HEIGHT: 65 IN | WEIGHT: 145.06 LBS | OXYGEN SATURATION: 99 %

## 2023-05-08 VITALS — OXYGEN SATURATION: 98 % | TEMPERATURE: 98 F | HEART RATE: 76 BPM | RESPIRATION RATE: 14 BRPM

## 2023-05-08 DIAGNOSIS — M66.372 SPONTANEOUS RUPTURE OF FLEXOR TENDONS, LEFT ANKLE AND FOOT: ICD-10-CM

## 2023-05-08 DIAGNOSIS — Z98.890 OTHER SPECIFIED POSTPROCEDURAL STATES: Chronic | ICD-10-CM

## 2023-05-08 PROCEDURE — C1889: CPT

## 2023-05-08 PROCEDURE — 27665 REPAIR OF LEG TENDON EACH: CPT | Mod: LT

## 2023-05-08 PROCEDURE — 97161 PT EVAL LOW COMPLEX 20 MIN: CPT

## 2023-05-08 RX ORDER — ONDANSETRON 8 MG/1
4 TABLET, FILM COATED ORAL ONCE
Refills: 0 | Status: DISCONTINUED | OUTPATIENT
Start: 2023-05-08 | End: 2023-05-08

## 2023-05-08 RX ORDER — ESTRADIOL 4 UG/1
1 INSERT VAGINAL
Refills: 0 | DISCHARGE

## 2023-05-08 RX ORDER — SODIUM CHLORIDE 9 MG/ML
1000 INJECTION, SOLUTION INTRAVENOUS
Refills: 0 | Status: DISCONTINUED | OUTPATIENT
Start: 2023-05-08 | End: 2023-05-08

## 2023-05-08 RX ORDER — OXYCODONE HYDROCHLORIDE 5 MG/1
5 TABLET ORAL ONCE
Refills: 0 | Status: DISCONTINUED | OUTPATIENT
Start: 2023-05-08 | End: 2023-05-08

## 2023-05-08 RX ORDER — CEFAZOLIN SODIUM 1 G
2000 VIAL (EA) INJECTION ONCE
Refills: 0 | Status: DISCONTINUED | OUTPATIENT
Start: 2023-05-08 | End: 2023-05-08

## 2023-05-08 RX ORDER — HYDROMORPHONE HYDROCHLORIDE 2 MG/ML
0.5 INJECTION INTRAMUSCULAR; INTRAVENOUS; SUBCUTANEOUS
Refills: 0 | Status: DISCONTINUED | OUTPATIENT
Start: 2023-05-08 | End: 2023-05-08

## 2023-05-08 RX ORDER — BUPROPION HYDROCHLORIDE 150 MG/1
1 TABLET, EXTENDED RELEASE ORAL
Qty: 0 | Refills: 0 | DISCHARGE

## 2023-05-08 RX ADMIN — SODIUM CHLORIDE 50 MILLILITER(S): 9 INJECTION, SOLUTION INTRAVENOUS at 12:52

## 2023-05-08 NOTE — ASU DISCHARGE PLAN (ADULT/PEDIATRIC) - ASU DC SPECIAL INSTRUCTIONSFT
Non weight bearing on left foot. Please dispense crutches Non weight bearing on left foot. Please dispense a walker

## 2023-05-08 NOTE — BRIEF OPERATIVE NOTE - NSICDXBRIEFPREOP_GEN_ALL_CORE_FT
PRE-OP DIAGNOSIS:  Laceration of left extensor hallucis longus tendon 08-May-2023 17:43:23  Pavel Cabral

## 2023-05-08 NOTE — BRIEF OPERATIVE NOTE - NSICDXBRIEFPOSTOP_GEN_ALL_CORE_FT
POST-OP DIAGNOSIS:  Laceration of left extensor hallucis longus tendon 08-May-2023 17:43:47  Pavel Cabral

## 2023-05-08 NOTE — ASU DISCHARGE PLAN (ADULT/PEDIATRIC) - CARE PROVIDER_API CALL
Wendy Saldaña (ROSALBA)  Orthopaedic Surgery Surgery  [address] [facility], [address] [place] [address]  Phone: [phone]  Fax: [phone]  Follow Up Time: 1 week

## 2023-05-08 NOTE — PHYSICAL THERAPY INITIAL EVALUATION ADULT - ADDITIONAL COMMENTS
Patient lives in private home, +NISHA then 1 flight to second floor bedroom. Patient was independent in all ADLs and ambulation without device

## 2023-05-08 NOTE — PHYSICAL THERAPY INITIAL EVALUATION ADULT - WORK/LEISURE ACTIVITY, REHAB EVAL
BEHAVIORAL HEALTH PROGRESS NOTE    Date: 6/26/2018    Attending: Alivn Aguirre MD     Problem List:   Principal Problem:    Recurrent major depressive disorder (CMS/HCC)  Active Problems:    Suicidal ideation    Generalized anxiety disorder    ADD (attention deficit disorder)    Wound infection after surgery      Subjective: Deedee Dowell is a 26 year old female who was admitted on 6/24/2018 for depression suicidal ideation    Social anxious and dysphoric denies active thoughts of suicide    She is driving she's had some nausea with meds also funding medication issue with these meds were discussed possible    Funding    She's now she's going to groups    Multiple stressors she lost her job [employer] boyfriend broke up with her she had her postop infection    Seems be coping recently back on meds mother is a big support for her she lives with her and reports she gets along well with her length she's had depressed not suicidal thought she did get some sleep she's had her diabetes because of nausea from the meds.         ALLERGIES, SOCIAL AND FAMILY HISTORY REVIEWED    Scheduled Medications:   • ciprofloxacin  750 mg Oral BID   • escitalopram  10 mg Oral Daily   • ARIPiprazole  2 mg Oral Daily        Objective:   Vital 24 Hour Range Most Recent Value   Temperature No Data Recorded 98.4 °F (36.9 °C)   Pulse No Data Recorded 96   Respiratory No Data Recorded 16   Blood Pressure No Data Recorded 111/76   Pulse Oximetry No Data Recorded    O2 No Data Recorded      Vital Most Recent Value First Value   Weight 67.6 kg Weight: 68 kg   Height 5' 6\" (167.6 cm) Height: 5' 6\" (167.6 cm)       Mental Status Exam:  APPEARANCE: Appropriately dressed  GROOMING: Normally groomed  ATTITUDE:  Appropriate  PSYCHOMOTOR STATE: Normal psychomotor activity  ABNORMAL MOVEMENTS OR POSTURE: Normal movements and posture  EYE CONTACT:  Appropriate  SPEECH: Normal rate and rhythm   MOOD: Anxious depressed  AFFECT: Restricted  THOUGHT  PROCESS:  Logical without any formal thought disorder guarded  THOUGHT CONTENT: Normal thought content without delusions, hallucinations or perceptual disturbances  PERCEPTION: Normal   CONSCIOUSNESS:  Normal level of consciousness  ORIENTATION:  Oriented x3  MEMORY:  IMMEDIATE: Intact                        RECENT: Intact                      REMOTE: Intact  ATTENTION:  Normal attention span  CONCENTRATION: normal  INSIGHT:  Intact  JUDGMENT:  Appropriate judgment  SUICIDAL IDEATIONS: No suicidal ideation thoughts have diminished  HOMICIDAL IDEATIONS: No homicidal ideation      ASSESSMENT:    Axis I: Major depression recurrent moderate to severe    Axis II: Deferred    Axis: III: Nausea from medications postop infection    Axis IV: Moderate to severe    Axis V: current GAF: 38, Previous GAF: 30        PLAN  I'll continue med trials usually nausea settles with the meds discussed possibly [facility] funding her check on some samples of clinic the patient assistance estimating DHS follow-up he might have some grades groups programming inpatient treatment to resolution of depressive symptoms supportive therapy dealing with multiple psychosocial stressors    Patient was seen for 30 minute visit; 20 minutes for supportive psychotherapy and 10 minutes for evaluation and management of medications.    Alvin Aguirre MD  6/26/2018  10:30 AM   independent

## 2023-05-21 NOTE — REVIEW OF SYSTEMS
Pt presents with c/o abscess in the lower jaw, wants abx   S/x started yesterday   Does not have a primary dentist will give dental list   No otc meds taken today        [Arthralgia] : arthralgia [Negative] : Heme/Lymph

## 2023-07-13 ENCOUNTER — NON-APPOINTMENT (OUTPATIENT)
Age: 63
End: 2023-07-13

## 2023-07-13 ENCOUNTER — APPOINTMENT (OUTPATIENT)
Dept: NEUROLOGY | Facility: CLINIC | Age: 63
End: 2023-07-13
Payer: COMMERCIAL

## 2023-07-13 DIAGNOSIS — M54.2 CERVICALGIA: ICD-10-CM

## 2023-07-13 PROCEDURE — 99215 OFFICE O/P EST HI 40 MIN: CPT | Mod: 95

## 2023-07-13 NOTE — PHYSICAL EXAM
[Person] : oriented to person [Total Score ___ / 30] : the patient achieved a score of [unfilled] /30 [Cranial Nerves Oculomotor (III)] : extraocular motion intact [Cranial Nerves Facial (VII)] : face symmetrical [Cranial Nerves Vestibulocochlear (VIII)] : hearing was intact bilaterally [Cranial Nerves Accessory (XI - Cranial And Spinal)] : head turning and shoulder shrug symmetric [Motor Strength] : muscle strength was normal in all four extremities [Abnormal Walk] : normal gait [Coordination - Dysmetria Impaired Finger-to-Nose Bilateral] : not present [FreeTextEntry6] : Able to shrug her shoulders and turn her head from side to side without any difficulties.

## 2023-07-13 NOTE — DISCUSSION/SUMMARY
[FreeTextEntry1] : 62-year-old woman who is here for a different symptom of left neck spasms likely due to overuse during her 4 hours family session with her granddaughter.  Patient will continue Flexeril 10 mg twice daily as needed.  Patient will have her homocysteine levels repeated during her follow-up visit.  It was slightly elevated at 10.8 previously.  Patient was also noted to have low normal B12 and low vitamin D.  She is following up with her PMD regarding this.  Patient will follow-up with me in a couple of months.\par \par physician location: office\par patient location: home\par \par I spent  minutes of total time, during which more than 50% of the time was spent on counseling. I explained the diagnosis, other possible diagnoses, workup, and management, as well as answered any questions.\par \par This is a telemedicine visit that was performed using real time 2-way audiovisual technology. Verbal consent to participate in video visit was obtained and patient was aware of their right to refuse to participate in services delivered via telemedicine and the alternative and potential limitations of participating in a telemedicine visit vs a face-to-face visit; I have also informed the patient of my current location in the [facility] and the names of all persons participating in the telemedicine service and their role in the encounter. The patient agrees to have this service via Telehealth.\par \par  This visit occurred during the Coronavirus (COVID-19) Public Health Emergency. I discussed with the patient the nature of our telemedicine visits, that: \par • I would evaluate the patient and recommend diagnostics and treatments based on my assessment \par • Our sessions are not being recorded and that personal health information is protected \par • Our team would provide follow up care in person if/when the patient needs it.\par

## 2023-07-13 NOTE — HISTORY OF PRESENT ILLNESS
[FreeTextEntry1] : verbal consent given on 07/13/2023 and 11:18  by BRE LUGO at [address] [place] [address]\[place] [address]\par \par \par 62-year-old woman is here for initial consultation of memory problems for the past 2 years.  Her previous neurologist evaluated her and thought that the memory loss may be due to stress and anxiety.  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  Patient finds it hard to finish tasks.  Patient has no personality change and has not gotten lost in familiar places and she has no hallucinations.  Patient has a history of back issues and therefore her balance was never great.  Patient states that recently she left the gas on the stove. \par \par Patient has a history of B12 deficiency even though patient had no GI surgery nor does she have a dietary restriction.  Patient used to be on nasal B12 supplementation and while on it her B12 in August was 391.  The B12 supplement has since been discontinued by Dr. King\par \par Interval history July 13, 2023: Recently patient was swimming for 4 hours with her grandchild and she now has left neck spasms.  Patient is able to turn her head from side to side and there is no visible muscle spasms on the video visit.  Patient has also been going to massage therapist and has been helping with her symptoms.\par \par CONSENT FOR VIDEO MEDICAL VISIT1. I understand that my physician wishes me to engage in a telemedicine consultation.2. My physician has explained to me how the video conferencing technology will be used to affect such a consultation will not be the same as a direct patient/health care provider visit due to the fact that I will not be in the same room as my physician 3. I understand there are potential risks to this technology, including interruptions, unauthorized access and technical difficulties. I understand that my health care provider or I can discontinue the telemedicine consult/visit if it is felt that the videoconferencing connections are not adequate for the situation.4. I understand that my healthcare information may be shared with other individuals for scheduling and billing purposes. Others may also be present during the consultation other than my physician and consulting health care provider in order to operate the video equipment. The above mentioned people will all maintain confidentiality of the information obtained. I further understand that I will be informed of their presence in the consultation and thus will have the right to request the following: (1) omit specific details of my medical history/physical examination that are personally sensitive to me; (2) ask non-medical personnel to leave the telemedicine examination room: and or (3) terminate the consultation at any time.5. I have had the alternatives to a telemedicine consultation explained to me, and in choosing to participate in a telemedicine consultation. I understand that some parts of the exam involving physical tests may be conducted by individuals at my location at the direction of the consulting health care provider.6. In an emergent consultation, I understand that the responsibility of the telemedicine consulting specialist is to advise my local practitioner and that the specialist’s responsibility will conclude upon the termination of the video conference connection.7. I understand that billing will occur from both my physician and as a facility fee from the site from which I am presented.8. I have had a direct conversation with my physician, during which I had the opportunity to ask questions in regard to this procedure. My questions have been answered and the risks, benefits and any practical alternatives have been discussed with me in a language in which I understand\par

## 2023-11-08 ENCOUNTER — RX RENEWAL (OUTPATIENT)
Age: 63
End: 2023-11-08

## 2023-11-10 ENCOUNTER — APPOINTMENT (OUTPATIENT)
Dept: INTERNAL MEDICINE | Facility: CLINIC | Age: 63
End: 2023-11-10
Payer: COMMERCIAL

## 2023-11-10 VITALS
RESPIRATION RATE: 14 BRPM | DIASTOLIC BLOOD PRESSURE: 64 MMHG | OXYGEN SATURATION: 97 % | BODY MASS INDEX: 23.66 KG/M2 | WEIGHT: 142 LBS | SYSTOLIC BLOOD PRESSURE: 110 MMHG | TEMPERATURE: 97.7 F | HEART RATE: 82 BPM | HEIGHT: 65 IN

## 2023-11-10 DIAGNOSIS — Z23 ENCOUNTER FOR IMMUNIZATION: ICD-10-CM

## 2023-11-10 DIAGNOSIS — Z00.00 ENCOUNTER FOR GENERAL ADULT MEDICAL EXAMINATION W/OUT ABNORMAL FINDINGS: ICD-10-CM

## 2023-11-10 PROCEDURE — 99396 PREV VISIT EST AGE 40-64: CPT

## 2023-11-10 RX ORDER — PAROXETINE 25 MG/1
25 TABLET, FILM COATED, EXTENDED RELEASE ORAL DAILY
Qty: 90 | Refills: 3 | Status: ACTIVE | COMMUNITY
Start: 2022-08-19 | End: 1900-01-01

## 2023-11-11 LAB
25(OH)D3 SERPL-MCNC: 30.4 NG/ML
ALBUMIN SERPL ELPH-MCNC: 4.6 G/DL
ALP BLD-CCNC: 52 U/L
ALT SERPL-CCNC: 20 U/L
ANION GAP SERPL CALC-SCNC: 11 MMOL/L
APPEARANCE: CLEAR
AST SERPL-CCNC: 28 U/L
BACTERIA: NEGATIVE /HPF
BASOPHILS # BLD AUTO: 0.06 K/UL
BASOPHILS NFR BLD AUTO: 0.7 %
BILIRUB SERPL-MCNC: 0.4 MG/DL
BILIRUBIN URINE: NEGATIVE
BLOOD URINE: NEGATIVE
BUN SERPL-MCNC: 14 MG/DL
CALCIUM SERPL-MCNC: 9.8 MG/DL
CAST: 2 /LPF
CHLORIDE SERPL-SCNC: 100 MMOL/L
CHOLEST SERPL-MCNC: 291 MG/DL
CO2 SERPL-SCNC: 26 MMOL/L
COLOR: YELLOW
CREAT SERPL-MCNC: 0.78 MG/DL
EGFR: 86 ML/MIN/1.73M2
EOSINOPHIL # BLD AUTO: 0.15 K/UL
EOSINOPHIL NFR BLD AUTO: 1.8 %
EPITHELIAL CELLS: 4 /HPF
ESTIMATED AVERAGE GLUCOSE: 120 MG/DL
FOLATE SERPL-MCNC: 13.3 NG/ML
GLUCOSE QUALITATIVE U: NEGATIVE MG/DL
GLUCOSE SERPL-MCNC: 94 MG/DL
HBA1C MFR BLD HPLC: 5.8 %
HCT VFR BLD CALC: 42.7 %
HDLC SERPL-MCNC: 93 MG/DL
HGB BLD-MCNC: 14 G/DL
IMM GRANULOCYTES NFR BLD AUTO: 0.2 %
KETONES URINE: NEGATIVE MG/DL
LDLC SERPL CALC-MCNC: 181 MG/DL
LEUKOCYTE ESTERASE URINE: NEGATIVE
LYMPHOCYTES # BLD AUTO: 2.82 K/UL
LYMPHOCYTES NFR BLD AUTO: 33.9 %
MAN DIFF?: NORMAL
MCHC RBC-ENTMCNC: 29.7 PG
MCHC RBC-ENTMCNC: 32.8 GM/DL
MCV RBC AUTO: 90.5 FL
MICROSCOPIC-UA: NORMAL
MONOCYTES # BLD AUTO: 0.53 K/UL
MONOCYTES NFR BLD AUTO: 6.4 %
NEUTROPHILS # BLD AUTO: 4.74 K/UL
NEUTROPHILS NFR BLD AUTO: 57 %
NITRITE URINE: NEGATIVE
NONHDLC SERPL-MCNC: 198 MG/DL
PH URINE: 6
PLATELET # BLD AUTO: 394 K/UL
POTASSIUM SERPL-SCNC: 4.6 MMOL/L
PROT SERPL-MCNC: 7.2 G/DL
PROTEIN URINE: NEGATIVE MG/DL
RBC # BLD: 4.72 M/UL
RBC # FLD: 12.5 %
RED BLOOD CELLS URINE: 8 /HPF
REVIEW: NORMAL
SODIUM SERPL-SCNC: 137 MMOL/L
SPECIFIC GRAVITY URINE: >1.03
TRIGL SERPL-MCNC: 104 MG/DL
TSH SERPL-ACNC: 1.64 UIU/ML
URATE SERPL-MCNC: 3.3 MG/DL
UROBILINOGEN URINE: 0.2 MG/DL
VIT B12 SERPL-MCNC: 863 PG/ML
WBC # FLD AUTO: 8.32 K/UL
WHITE BLOOD CELLS URINE: 0 /HPF

## 2023-11-11 RX ORDER — ATORVASTATIN CALCIUM 10 MG/1
10 TABLET, FILM COATED ORAL
Qty: 90 | Refills: 3 | Status: ACTIVE | COMMUNITY
Start: 2023-11-11 | End: 1900-01-01

## 2023-12-29 ENCOUNTER — APPOINTMENT (OUTPATIENT)
Dept: INTERNAL MEDICINE | Facility: CLINIC | Age: 63
End: 2023-12-29
Payer: COMMERCIAL

## 2023-12-29 VITALS
DIASTOLIC BLOOD PRESSURE: 60 MMHG | BODY MASS INDEX: 23.32 KG/M2 | HEIGHT: 65 IN | TEMPERATURE: 98.2 F | RESPIRATION RATE: 14 BRPM | OXYGEN SATURATION: 98 % | WEIGHT: 140 LBS | SYSTOLIC BLOOD PRESSURE: 110 MMHG | HEART RATE: 76 BPM

## 2023-12-29 DIAGNOSIS — E53.8 DEFICIENCY OF OTHER SPECIFIED B GROUP VITAMINS: ICD-10-CM

## 2023-12-29 DIAGNOSIS — F41.9 ANXIETY DISORDER, UNSPECIFIED: ICD-10-CM

## 2023-12-29 DIAGNOSIS — Z01.818 ENCOUNTER FOR OTHER PREPROCEDURAL EXAMINATION: ICD-10-CM

## 2023-12-29 DIAGNOSIS — F32.A ANXIETY DISORDER, UNSPECIFIED: ICD-10-CM

## 2023-12-29 DIAGNOSIS — E78.5 HYPERLIPIDEMIA, UNSPECIFIED: ICD-10-CM

## 2023-12-29 PROCEDURE — 99214 OFFICE O/P EST MOD 30 MIN: CPT

## 2023-12-29 RX ORDER — DAPTOMYCIN 500 MG/20ML
500 INJECTION, POWDER, LYOPHILIZED, FOR SUSPENSION INTRAVENOUS
Refills: 0 | Status: ACTIVE | COMMUNITY
Start: 2023-12-29

## 2023-12-29 RX ORDER — CYCLOBENZAPRINE HYDROCHLORIDE 10 MG/1
10 TABLET, FILM COATED ORAL
Qty: 60 | Refills: 2 | Status: DISCONTINUED | COMMUNITY
Start: 2023-07-13 | End: 2023-12-29

## 2023-12-29 NOTE — HISTORY OF PRESENT ILLNESS
[No Pertinent Cardiac History] : no history of aortic stenosis, atrial fibrillation, coronary artery disease, recent myocardial infarction, or implantable device/pacemaker [No Adverse Anesthesia Reaction] : no adverse anesthesia reaction in self or family member [No Pertinent Pulmonary History] : no history of asthma, COPD, sleep apnea, or smoking [(Patient denies any chest pain, claudication, dyspnea on exertion, orthopnea, palpitations or syncope)] : Patient denies any chest pain, claudication, dyspnea on exertion, orthopnea, palpitations or syncope [Moderate (4-6 METs)] : Moderate (4-6 METs) [Chronic Anticoagulation] : no chronic anticoagulation [Chronic Kidney Disease] : no chronic kidney disease [Diabetes] : no diabetes [FreeTextEntry1] : left foot surgery  [FreeTextEntry2] : 1/2/24 [FreeTextEntry3] : Dr Hamilton Gu [FreeTextEntry4] : 63 year old female with PMH anxiety, depression, vitamin B12 deficiency, HLD who presents today for pre-op clearance prior to left foot surgery.  After her initial surgery in April, she developed a staph infection and the wound did not close. She has been on IV daptomycin since PICC line. She is feeling well, has no acute complaints.

## 2023-12-29 NOTE — ASSESSMENT
[High Risk Surgery - Intraperitoneal, Intrathoracic or Supringuinal Vascular Procedures] : High Risk Surgery - Intraperitoneal, Intrathoracic or Supringuinal Vascular Procedures - No (0) [Ischemic Heart Disease] : Ischemic Heart Disease - No (0) [Congestive Heart Failure] : Congestive Heart Failure - No (0) [Prior Cerebrovascular Accident or TIA] : Prior Cerebrovascular Accident or TIA - No (0) [Creatinine >= 2mg/dL (1 Point)] : Creatinine >= 2mg/dL - No (0) [Insulin-dependent Diabetic (1 Point)] : Insulin-dependent Diabetic - No (0) [0] : 0 , RCRI Class: I, Risk of Post-Op Cardiac Complications: 3.9%, 95% CI for Risk Estimate: 2.8% - 5.4% [Patient Optimized for Surgery] : Patient optimized for surgery [No Further Testing Recommended] : no further testing recommended [As per surgery] : as per surgery [FreeTextEntry4] : 63 year old female with PMH anxiety, depression, vitamin B12 deficiency, HLD who presents today for pre-op clearance prior to left foot surgery. She feels well and has no acute complaints.  She has no chest pain, palpitations, SOB, orthopnea, PND, LE edema. She has good functional capacity.  Preop labs have been reviewed and are within acceptable range for surgery. EKG is normal sinus rhythm with no acute changes.  Patient is medically optimized for planned procedure.

## 2024-02-09 ENCOUNTER — RX RENEWAL (OUTPATIENT)
Age: 64
End: 2024-02-09

## 2024-02-09 RX ORDER — BUPROPION HYDROCHLORIDE 150 MG/1
150 TABLET, EXTENDED RELEASE ORAL DAILY
Qty: 90 | Refills: 1 | Status: ACTIVE | COMMUNITY
Start: 2022-08-19 | End: 1900-01-01

## 2024-02-22 ENCOUNTER — RX RENEWAL (OUTPATIENT)
Age: 64
End: 2024-02-22

## 2024-06-06 ENCOUNTER — RX RENEWAL (OUTPATIENT)
Age: 64
End: 2024-06-06

## 2024-06-06 RX ORDER — CYANOCOBALAMIN 500 UG/1
500 SPRAY NASAL
Refills: 2 | Status: ACTIVE | COMMUNITY
Start: 2022-08-19 | End: 1900-01-01

## 2024-07-03 ENCOUNTER — RX RENEWAL (OUTPATIENT)
Age: 64
End: 2024-07-03

## 2024-07-29 NOTE — ED ADULT NURSE NOTE - IS THE PATIENT ABLE TO BE SCREENED?
Attestation/Supervisory note for MELANIE Chaney      The patient is a 60-year-old female presenting to the emergency department for evaluation of generalized malaise and fatigue.  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  She states that she started having the generalized malaise and fatigue over the past several days.  She states that yesterday it seemed a little worse.  She states that sometimes she has a mild headache.  It is diffuse.  No better or worse.  No radiation.  She states that she does not currently have a headache.  She states that sometimes she feels a little short of breath as well.  She states that she just feels winded when she does any activities.  She denies any fever or chills.  No cough or congestion.  No visual changes.  No focal weakness or numbness.  No chest pain.  No palpitations.  No diaphoresis.  No abdominal pain.  No nausea vomiting.  No diarrhea or constipation.  No urinary complaints.  No sick contacts or recent travel.  All pertinent positives and negatives are recorded above.  All other systems reviewed and otherwise negative.  Vital signs with mild diastolic hypertension but otherwise within normal limits.  Physical exam with a well-nourished well-developed female in no acute distress.  HEENT exam with dry mucous membranes but otherwise unremarkable.  She has no evidence of airway compromise or respiratory distress.  Abdominal exam is benign.  She does not have any gross motor, neurologic or vascular deficits on exam.  NIH stroke scale score of 0.      EKG with normal sinus rhythm at 61 bpm, normal axis, normal voltage, normal ST segment, and a slight diffuse flattening of the T waves      Diagnostic labs with mild electrolyte imbalance but otherwise unremarkable.      Initial Troponin T < 6.  Repeat trop T < 6      COVID-19 testing negative        XR chest 1 view   Final Result   No focal infiltrate or pneumothorax is identified.         MACRO:   None.        Signed by: Freddy Darnell 7/29/2024 12:45 PM   Dictation workstation:   [id number]           The patient does not have any evidence of ischemia on EKG or cardiac enzymes.  No events on telemetry.  Patient does not have any gross motor, neurologic or vascular deficits on exam.  She has a benign abdominal exam.  Her pulses are equal bilaterally.  Chest x-ray does not show any evidence of pneumonia or pneumothorax.  No evidence of CHF.  No widening of the mediastinum.  The patient was able to complete a trial ambulation without difficulty in the emergency department.      The patient was released in good condition.  She was instructed to follow-up with her primary care physician within 1 to 2 days for further management of her current symptoms and repeat check of her blood pressure.  She will return to the emergency department sooner with worsening of symptoms or onset of new symptoms      Impression/diagnosis:  1.  Malaise and fatigue  2.  Mild electrolyte imbalance      I personally saw the patient and made/approve the management plan and take responsibility for the patient management.      I independently interpreted the following study (S) EKG and diagnostic labs      I personally discussed the patient's management with the patient      I reviewed the results of the diagnostic labs and diagnostic imaging.  Formal radiology read was completed by the radiologist.      Susana Michael MD   Yes

## 2024-10-11 ENCOUNTER — APPOINTMENT (OUTPATIENT)
Dept: INTERNAL MEDICINE | Facility: CLINIC | Age: 64
End: 2024-10-11
Payer: COMMERCIAL

## 2024-10-11 ENCOUNTER — TRANSCRIPTION ENCOUNTER (OUTPATIENT)
Age: 64
End: 2024-10-11

## 2024-10-11 ENCOUNTER — LABORATORY RESULT (OUTPATIENT)
Age: 64
End: 2024-10-11

## 2024-10-11 VITALS
SYSTOLIC BLOOD PRESSURE: 116 MMHG | BODY MASS INDEX: 23.49 KG/M2 | TEMPERATURE: 98.2 F | RESPIRATION RATE: 14 BRPM | OXYGEN SATURATION: 99 % | HEIGHT: 65 IN | HEART RATE: 83 BPM | WEIGHT: 141 LBS | DIASTOLIC BLOOD PRESSURE: 80 MMHG

## 2024-10-11 DIAGNOSIS — E78.5 HYPERLIPIDEMIA, UNSPECIFIED: ICD-10-CM

## 2024-10-11 DIAGNOSIS — N30.01 ACUTE CYSTITIS WITH HEMATURIA: ICD-10-CM

## 2024-10-11 LAB
BILIRUB UR QL STRIP: NEGATIVE
CLARITY UR: CLEAR
COLLECTION METHOD: NORMAL
GLUCOSE UR-MCNC: NEGATIVE
HCG UR QL: 0.2 EU/DL
HGB UR QL STRIP.AUTO: NORMAL
KETONES UR-MCNC: NEGATIVE
LEUKOCYTE ESTERASE UR QL STRIP: NEGATIVE
NITRITE UR QL STRIP: NEGATIVE
PH UR STRIP: 6
PROT UR STRIP-MCNC: NEGATIVE
SP GR UR STRIP: 1.02

## 2024-10-11 PROCEDURE — 99213 OFFICE O/P EST LOW 20 MIN: CPT

## 2024-10-11 PROCEDURE — 81003 URINALYSIS AUTO W/O SCOPE: CPT | Mod: QW

## 2024-10-11 PROCEDURE — G2211 COMPLEX E/M VISIT ADD ON: CPT | Mod: NC

## 2024-10-11 RX ORDER — NITROFURANTOIN (MONOHYDRATE/MACROCRYSTALS) 25; 75 MG/1; MG/1
100 CAPSULE ORAL
Qty: 14 | Refills: 0 | Status: ACTIVE | COMMUNITY
Start: 2024-10-11 | End: 1900-01-01

## 2024-10-15 LAB
APPEARANCE: CLEAR
BACTERIA UR CULT: NORMAL
BILIRUBIN URINE: NEGATIVE
BLOOD URINE: NEGATIVE
COLOR: YELLOW
GLUCOSE QUALITATIVE U: NEGATIVE MG/DL
KETONES URINE: NEGATIVE MG/DL
LEUKOCYTE ESTERASE URINE: ABNORMAL
NITRITE URINE: NEGATIVE
PH URINE: 6
PROTEIN URINE: NEGATIVE MG/DL
SPECIFIC GRAVITY URINE: 1.02
UROBILINOGEN URINE: 0.2 MG/DL

## 2024-10-21 ENCOUNTER — RX RENEWAL (OUTPATIENT)
Age: 64
End: 2024-10-21

## 2024-10-25 DIAGNOSIS — Z00.00 ENCOUNTER FOR GENERAL ADULT MEDICAL EXAMINATION W/OUT ABNORMAL FINDINGS: ICD-10-CM

## 2024-10-30 ENCOUNTER — NON-APPOINTMENT (OUTPATIENT)
Age: 64
End: 2024-10-30

## 2024-10-30 LAB
BASOPHILS # BLD AUTO: 0.06 K/UL
BASOPHILS NFR BLD AUTO: 0.8 %
EOSINOPHIL # BLD AUTO: 0.13 K/UL
EOSINOPHIL NFR BLD AUTO: 1.8 %
ESTIMATED AVERAGE GLUCOSE: 117 MG/DL
HBA1C MFR BLD HPLC: 5.7 %
HCT VFR BLD CALC: 39.1 %
HGB BLD-MCNC: 12.6 G/DL
IMM GRANULOCYTES NFR BLD AUTO: 0.3 %
LYMPHOCYTES # BLD AUTO: 2.82 K/UL
LYMPHOCYTES NFR BLD AUTO: 38.8 %
MAN DIFF?: NORMAL
MCHC RBC-ENTMCNC: 29.6 PG
MCHC RBC-ENTMCNC: 32.2 G/DL
MCV RBC AUTO: 92 FL
MONOCYTES # BLD AUTO: 0.7 K/UL
MONOCYTES NFR BLD AUTO: 9.6 %
NEUTROPHILS # BLD AUTO: 3.53 K/UL
NEUTROPHILS NFR BLD AUTO: 48.7 %
PLATELET # BLD AUTO: 323 K/UL
RBC # BLD: 4.25 M/UL
RBC # FLD: 12.7 %
WBC # FLD AUTO: 7.26 K/UL

## 2024-10-31 LAB
25(OH)D3 SERPL-MCNC: 27.9 NG/ML
ALBUMIN SERPL ELPH-MCNC: 4 G/DL
ALP BLD-CCNC: 51 U/L
ALT SERPL-CCNC: 15 U/L
ANION GAP SERPL CALC-SCNC: 12 MMOL/L
APPEARANCE: CLEAR
AST SERPL-CCNC: 20 U/L
BACTERIA: ABNORMAL /HPF
BILIRUB SERPL-MCNC: 0.3 MG/DL
BILIRUBIN URINE: NEGATIVE
BLOOD URINE: ABNORMAL
BUN SERPL-MCNC: 19 MG/DL
CALCIUM OXALATE CRYSTALS: PRESENT
CALCIUM SERPL-MCNC: 9.5 MG/DL
CAST: 1 /LPF
CHLORIDE SERPL-SCNC: 105 MMOL/L
CHOLEST SERPL-MCNC: 183 MG/DL
CO2 SERPL-SCNC: 25 MMOL/L
COLOR: YELLOW
CREAT SERPL-MCNC: 0.84 MG/DL
EGFR: 79 ML/MIN/1.73M2
EPITHELIAL CELLS: 14 /HPF
FOLATE SERPL-MCNC: 10.2 NG/ML
GLUCOSE QUALITATIVE U: NEGATIVE MG/DL
GLUCOSE SERPL-MCNC: 97 MG/DL
HDLC SERPL-MCNC: 72 MG/DL
KETONES URINE: NEGATIVE MG/DL
LDLC SERPL CALC-MCNC: 97 MG/DL
LEUKOCYTE ESTERASE URINE: ABNORMAL
MICROSCOPIC-UA: NORMAL
NITRITE URINE: NEGATIVE
NONHDLC SERPL-MCNC: 110 MG/DL
PH URINE: 6
POTASSIUM SERPL-SCNC: 4.1 MMOL/L
PROT SERPL-MCNC: 6.5 G/DL
PROTEIN URINE: 30 MG/DL
RED BLOOD CELLS URINE: 15 /HPF
REVIEW: NORMAL
SODIUM SERPL-SCNC: 142 MMOL/L
SPECIFIC GRAVITY URINE: >1.03
TRIGL SERPL-MCNC: 71 MG/DL
TSH SERPL-ACNC: 2.89 UIU/ML
UROBILINOGEN URINE: 0.2 MG/DL
VIT B12 SERPL-MCNC: 554 PG/ML
WHITE BLOOD CELLS URINE: 5 /HPF

## 2024-11-01 ENCOUNTER — OUTPATIENT (OUTPATIENT)
Dept: OUTPATIENT SERVICES | Facility: HOSPITAL | Age: 64
LOS: 1 days | End: 2024-11-01
Payer: COMMERCIAL

## 2024-11-01 ENCOUNTER — OUTPATIENT (OUTPATIENT)
Dept: OUTPATIENT SERVICES | Facility: HOSPITAL | Age: 64
LOS: 1 days | End: 2024-11-01

## 2024-11-01 ENCOUNTER — APPOINTMENT (OUTPATIENT)
Dept: CT IMAGING | Facility: CLINIC | Age: 64
End: 2024-11-01

## 2024-11-01 DIAGNOSIS — Z00.8 ENCOUNTER FOR OTHER GENERAL EXAMINATION: ICD-10-CM

## 2024-11-01 DIAGNOSIS — Z98.890 OTHER SPECIFIED POSTPROCEDURAL STATES: Chronic | ICD-10-CM

## 2024-11-01 PROCEDURE — 74178 CT ABD&PLV WO CNTR FLWD CNTR: CPT

## 2024-11-01 PROCEDURE — 74178 CT ABD&PLV WO CNTR FLWD CNTR: CPT | Mod: 26

## 2024-11-05 ENCOUNTER — APPOINTMENT (OUTPATIENT)
Dept: INTERNAL MEDICINE | Facility: CLINIC | Age: 64
End: 2024-11-05

## 2024-11-05 VITALS
OXYGEN SATURATION: 99 % | HEIGHT: 65 IN | BODY MASS INDEX: 24.66 KG/M2 | DIASTOLIC BLOOD PRESSURE: 70 MMHG | HEART RATE: 71 BPM | SYSTOLIC BLOOD PRESSURE: 121 MMHG | WEIGHT: 148 LBS

## 2024-11-19 ENCOUNTER — EMERGENCY (EMERGENCY)
Facility: HOSPITAL | Age: 64
LOS: 1 days | Discharge: HOME | End: 2024-11-19
Attending: STUDENT IN AN ORGANIZED HEALTH CARE EDUCATION/TRAINING PROGRAM | Admitting: STUDENT IN AN ORGANIZED HEALTH CARE EDUCATION/TRAINING PROGRAM
Payer: COMMERCIAL

## 2024-11-19 VITALS
WEIGHT: 147.05 LBS | RESPIRATION RATE: 14 BRPM | HEART RATE: 76 BPM | DIASTOLIC BLOOD PRESSURE: 78 MMHG | SYSTOLIC BLOOD PRESSURE: 126 MMHG | HEIGHT: 65 IN | TEMPERATURE: 98 F | OXYGEN SATURATION: 100 %

## 2024-11-19 VITALS
OXYGEN SATURATION: 99 % | HEART RATE: 75 BPM | SYSTOLIC BLOOD PRESSURE: 121 MMHG | DIASTOLIC BLOOD PRESSURE: 75 MMHG | RESPIRATION RATE: 15 BRPM | TEMPERATURE: 98 F

## 2024-11-19 DIAGNOSIS — Z98.890 OTHER SPECIFIED POSTPROCEDURAL STATES: Chronic | ICD-10-CM

## 2024-11-19 PROCEDURE — 72125 CT NECK SPINE W/O DYE: CPT | Mod: 26,MC

## 2024-11-19 PROCEDURE — 99284 EMERGENCY DEPT VISIT MOD MDM: CPT

## 2024-11-19 PROCEDURE — 70486 CT MAXILLOFACIAL W/O DYE: CPT | Mod: MC

## 2024-11-19 PROCEDURE — 70450 CT HEAD/BRAIN W/O DYE: CPT | Mod: 26,MC

## 2024-11-19 PROCEDURE — 70450 CT HEAD/BRAIN W/O DYE: CPT | Mod: MC

## 2024-11-19 PROCEDURE — 72125 CT NECK SPINE W/O DYE: CPT | Mod: MC

## 2024-11-19 PROCEDURE — 70486 CT MAXILLOFACIAL W/O DYE: CPT | Mod: 26,MC

## 2024-11-19 PROCEDURE — 99284 EMERGENCY DEPT VISIT MOD MDM: CPT | Mod: 25

## 2024-11-19 RX ORDER — ONDANSETRON HYDROCHLORIDE 2 MG/ML
1 INJECTION, SOLUTION INTRAMUSCULAR; INTRAVENOUS
Qty: 10 | Refills: 0
Start: 2024-11-19 | End: 2024-11-21

## 2024-11-19 NOTE — ED PROVIDER NOTE - ENMT, MLM
Airway patent, mild ecchymosis and tenderness orbital region (left worse than right). no nasal or jaw tenderness

## 2024-11-19 NOTE — ED PROVIDER NOTE - OBJECTIVE STATEMENT
64-year-old female with history of hyperlipidemia and anxiety presents with headache and nausea after head injury 3 days ago.  Patient tripped by the pool and hit her face on some cement surrounding the pool.  No LOC.  Does not take any blood thinners.  Has had headache since then.  Also has some bruising around bilateral eyes, left worse than right.  Mild intermittent nausea.  Nausea seen more today.  No vomiting.  Slight neck discomfort but believes it is chronic in nature.  Denies any mid or low back pain.  No chest pain or shortness of breath.  Denies any problems with ambulation.  Was seen urgent care prior to arrival and sent to emergency room for CAT scan  PCP Joyce Castillo

## 2024-11-19 NOTE — ED ADULT NURSE NOTE - TEMPLATE LIST FOR HEAD TO TOE ASSESSMENT
Telephone Encounter by Stella Sampson at 10/25/17 08:45 AM     Author:  Stella Sampson Service:  (none) Author Type:  Patient      Filed:  10/25/17 08:47 AM Encounter Date:  10/25/2017 Status:  Signed     :  Stella Sampson (Patient )              GREGG MARTÍNEZ    Patient Age: 59 year old   Refill request by:[SA1.1T] Walk in.  Patient wants a call back? Yes -  Ok to leave response (including medical information) on answering machine[SA1.1M]  Refill to be:[SA1.1T] Picked up at [SA1.1M]    Medication requested to be refilled:   carisoprodol (SOMA) 350 MG tablet Take 1 Tab by mouth 3 (three) times daily as needed for Muscle spasms. Will Cause Sedation.  No Driving, Operating Machines, or Alcohol Use 30 Tab 0   Hydrocodone-Acetaminophen (VICODIN ES) 7.5-300 MG TABS Take 1 Tab by mouth every 8 (eight) hours as needed for Pain. Will Cause Sedation.  No Driving, Operating Machines, or Alcohol Use 30 Tab 0[SA1.1T]   PER PATIENT.WOULD LIKE TO PICK BOTH PRESCRIPTIONS UP.PLEASE CALL WHEN READY FOR .[SA1.1M] Message confirmed with caller.          Next and Last Visit with Provider and Department  Next visit with MYA MARIN is on No match found  Next visit with [facility] is on No match found   Last visit with MYA MARIN was on 09/25/2017 at  9:15 AM in [facility]  Last visit with [facility] was on 09/25/2017 at  9:15 AM in [facility]      WEIGHT AND HEIGHT: As of 09/25/2017 weight is 199 lbs.(90.266 kg). Height is 5' 6\"(1.676 m).   BMI is 32.13 kg/(m^2) calculated from:     Height 5' 6\" (1.676 m) as of 9/25/17     Weight 199 lb (90.266 kg) as of 9/25/17      No Known Allergies  Current outpatient prescriptions       Medication  Sig Dispense Refill   • levothyroxine 88 MCG tablet Take 1 Tab by mouth daily. 90 Tab 1   • carisoprodol (SOMA) 350 MG tablet Take 1 Tab by mouth 3 (three) times daily as needed for Muscle spasms.  Will Cause Sedation.  No Driving, Operating Machines, or Alcohol Use 30 Tab 0   • Hydrocodone-Acetaminophen (VICODIN ES) 7.5-300 MG TABS Take 1 Tab by mouth every 8 (eight) hours as needed for Pain. Will Cause Sedation.  No Driving, Operating Machines, or Alcohol Use 30 Tab 0   • hydrochlorothiazide (HYDRODIURIL) 25 MG tablet TAKE 1 TABLET BY MOUTH EVERY DAY 90 Tab 0   • lisinopril (PRINIVIL,ZESTRIL) 40 MG tablet Take 1 Tab by mouth daily. 90 Tab 1   • aspirin 81 MG tablet Take 1 Tab by mouth daily. 30 Tab 12        ROUTING:[SA1.1T] Patient's physician/staff[SA1.1M]        PCP: Isatu Mccarthy MD         INS: Payor: [employer] OPEN ACCESS / Plan: N/A / Product Type: *No Product type* / Note: This is the primary coverage, but no account was found for this location or the patient's primary location.   ADDRESS:  [address] [place] [address][SA1.1T]       Revision History        User Key Date/Time User Provider Type Action    > SA1.1 10/25/17 08:47 AM Stella Sampson Patient  Sign    M - Manual, T - Template             Head Injury

## 2024-11-19 NOTE — ED PROVIDER NOTE - NSFOLLOWUPINSTRUCTIONS_ED_ALL_ED_FT
Ice  Tylenol over-the-counter as needed for pain  Follow-up with concussion clinic if symptoms continue or fail to improve.  Referral provided if needed.  Call to make appointment      Head Injury    WHAT YOU NEED TO KNOW:    What do I need to know about a head injury? A head injury can include your scalp, face, skull, or brain and range from mild to severe. Effects can appear immediately after the injury or develop later. The effects may last a short time or be permanent. Healthcare providers may want to check your recovery over time. Treatment may change as you recover or develop new health problems from the head injury.    What are the signs and symptoms of a head injury?    An open scalp or skin wound, swelling, or bruising    Mild to moderate headache    Dizziness or loss of balance    Nausea or vomiting    Ringing in the ears or neck pain    Confusion, especially right after the injury    Change in mood, such as feeling restless or irritable    Trouble thinking, remembering, or concentrating    Drowsiness or decreased amount of energy    Trouble sleeping  How is a head injury diagnosed?    Tell your healthcare provider about your injury and symptoms. Your provider may check how your pupils react to light. Your brain function, memory, hand grasp, and balance may also be checked.    You may need x-rays, a CT scan, or an MRI to check for bleeding or major damage to your skull or brain. You may be given contrast liquid to help the pictures show up better. Tell the healthcare provider if you have ever had an allergic reaction to contrast liquid. Do not enter the MRI room with anything metal. Metal can cause serious injury. Tell the provider if you have any metal in or on your body.  How is a head injury treated? A mild head injury may not need to be treated. You may be given medicine to decrease pain. A concussion, hematoma (collection of blood), or traumatic brain injury may need both immediate and long-term treatment.    How can I manage my symptoms?    Rest or do quiet activities. Limit your time watching TV, using the computer, or doing tasks that require a lot of thinking. Slowly return to your normal activities as directed. Do not play sports or do activities that may cause you to get hit in the head. Ask your healthcare provider when you can return to sports.    Apply ice on your head for 15 to 20 minutes every hour or as directed. Use an ice pack, or put crushed ice in a plastic bag. Cover it with a towel before you apply it. Ice helps decrease swelling and pain.    Have someone stay with you for 24 hours , or as directed. This person can monitor you for problems and call for help if needed. When you are awake, the person should ask you a few questions every few hours to see if you are thinking clearly. An example is to ask your name or address.  What can I do to prevent another head injury?    Wear a helmet that fits properly. Do this when you play sports, or ride a bike, scooter, or skateboard. Helmets help decrease your risk for a serious head injury. Talk to your healthcare provider about other ways you can protect yourself if you play sports.    Wear your seat belt every time you are in a car. This helps lower your risk for a head injury if you are in a car accident.  Call your local emergency number (911 in the ) or have someone call if:    You cannot be woken.    You have a seizure.    You stop responding to others or you faint.    You have blurry or double vision.    Your speech becomes slurred or confused.    You have arm or leg weakness, loss of feeling, or new problems with coordination.    Your pupils are larger than usual, or one pupil is a different size than the other.    You have blood or clear fluid coming out of your ears or nose.  When should I seek immediate care?    You have repeated or forceful vomiting.    You feel confused.    Your headache gets worse or becomes severe.    You or someone caring for you notices that you are harder to wake than usual.  When should I call my doctor?    Your symptoms last longer than 6 weeks after the injury.    You have questions or concerns about your condition or care.  CARE AGREEMENT:    You have the right to help plan your care. Learn about your health condition and how it may be treated. Discuss treatment options with your healthcare providers to decide what care you want to receive. You always have the right to refuse treatment.

## 2024-11-19 NOTE — ED PROVIDER NOTE - PROGRESS NOTE DETAILS
Patient stable.  Resting comfortably.  CT results discussed with patient.  No evidence of acute fracture or ICH.  Recommend follow-up with concussion clinic if symptoms continue or fail to improve.  Referral provided if needed.  Will call to make appointment

## 2024-11-19 NOTE — ED PROVIDER NOTE - PATIENT PORTAL LINK FT
You can access the FollowMyHealth Patient Portal offered by [facility] by registering at the following website: [email or web address]. By joining [employer]’s FollowMyHealth portal, you will also be able to view your health information using other applications (apps) compatible with our system.

## 2024-11-19 NOTE — ED ADULT TRIAGE NOTE - CHIEF COMPLAINT QUOTE
fell on saturday hit head to side of  pool. no loc collins fully c/o nausea but did vomit over weekend  left pupil  larger than right pupil but both reactive to light

## 2024-11-19 NOTE — ED ADULT NURSE NOTE - OBJECTIVE STATEMENT
pt comes to ed s/p fall on Saturday. pt tripped over loop loc pool cover hitting face on  coping. Pt denies LOC, states she also vomited over weekend, noted left eye possible vision changes with increased tear production. pt maex4, neuro intact, no acute focal deficits. sent from urgent care for ct

## 2024-11-19 NOTE — ED ADULT TRIAGE NOTE - BP NONINVASIVE DIASTOLIC (MM HG)
[facility] Call Center    Phone Message    May a detailed message be left on voicemail: yes     Reason for Call: Other: Patient called requesting a prescription for Oxycodone 30 mg be sent to her pharmacy, [facility] in [facility]. Per patient Previous pharmacy that was filling the patient's prescriptions is now refusing to fill this medication     Action Taken: Other: Routed to Pain Nurse    Travel Screening: Not Applicable                                                                         
Pending Prescriptions:                       Disp   Refills    oxyCODONE IR (ROXICODONE) 30 MG tablet    84 tab*0            Sig: Take 1 tablet (30 mg) by mouth 6 times daily Fill           8/6/24 for 8/7/24(14 day RX)      
Received request for a refill(s) of oxyCODONE IR (ROXICODONE) 30 MG tablet      Last dispensed from pharmacy on 07/23/24    Patient's last office/virtual visit by prescribing provider on 06/03/24  Next office/virtual appointment scheduled for 09/23/24    Last urine drug screen date 06/03/24  Current opioid agreement on file (completed within the last year) Yes Date of opioid agreement: 06/03/24    E-prescribe to pharmacy-[id number] - [place], [place] [id number] [facility]      Will route to nursing [facility] for review and preparation of prescription(s).       
78

## 2024-11-19 NOTE — ED PROVIDER NOTE - CLINICAL SUMMARY MEDICAL DECISION MAKING FREE TEXT BOX
Trip and fall with head and face injury differential includes of fracture, intracranial hemorrhage, soft tissue injury, concussion.  Will get CT.

## 2024-11-19 NOTE — ED ADULT NURSE NOTE - CHPI ED NUR SYMPTOMS NEG
no change in level of consciousness/no confusion/no dizziness/no loss of consciousness/no seizure/no syncope/no weakness

## 2024-11-19 NOTE — ED PROVIDER NOTE - PRINCIPAL DIAGNOSIS
[General Appearance - Well Developed] : well developed [Normal Appearance] : normal appearance [Well Groomed] : well groomed [General Appearance - Well Nourished] : well nourished [No Deformities] : no deformities [General Appearance - In No Acute Distress] : no acute distress [Normal Conjunctiva] : the conjunctiva exhibited no abnormalities [Eyelids - No Xanthelasma] : the eyelids demonstrated no xanthelasmas [Normal Oral Mucosa] : normal oral mucosa [No Oral Pallor] : no oral pallor [No Oral Cyanosis] : no oral cyanosis [Normal Jugular Venous A Waves Present] : normal jugular venous A waves present [Normal Jugular Venous V Waves Present] : normal jugular venous V waves present [No Jugular Venous Driver A Waves] : no jugular venous driver A waves [Respiration, Rhythm And Depth] : normal respiratory rhythm and effort [Exaggerated Use Of Accessory Muscles For Inspiration] : no accessory muscle use [Auscultation Breath Sounds / Voice Sounds] : lungs were clear to auscultation bilaterally [Heart Rate And Rhythm] : heart rate and rhythm were normal [Heart Sounds] : normal S1 and S2 [Murmurs] : no murmurs present [Abdomen Soft] : soft [Abdomen Tenderness] : non-tender [] : no hepato-splenomegaly [Abdomen Mass (___ Cm)] : no abdominal mass palpated [FreeTextEntry1] : 1x2 cm scalp lesion / fibroma on scalp  Head injury

## 2024-11-19 NOTE — ED PROVIDER NOTE - MUSCULOSKELETAL, MLM
Spine appears normal, range of motion is not limited, no vert tenderness no muscle or joint tenderness

## 2024-11-19 NOTE — ED PROVIDER NOTE - ATTENDING APP SHARED VISIT CONTRIBUTION OF CARE
This was a shared visit with PRACHI. I reviewed and verified the documentation and independently performed the documented MDM.

## 2024-11-22 ENCOUNTER — RX RENEWAL (OUTPATIENT)
Age: 64
End: 2024-11-22

## 2024-12-04 ENCOUNTER — APPOINTMENT (OUTPATIENT)
Dept: INTERNAL MEDICINE | Facility: CLINIC | Age: 64
End: 2024-12-04
Payer: COMMERCIAL

## 2024-12-04 ENCOUNTER — NON-APPOINTMENT (OUTPATIENT)
Age: 64
End: 2024-12-04

## 2024-12-04 VITALS
HEIGHT: 65 IN | DIASTOLIC BLOOD PRESSURE: 74 MMHG | HEART RATE: 83 BPM | RESPIRATION RATE: 14 BRPM | WEIGHT: 147 LBS | TEMPERATURE: 98.2 F | SYSTOLIC BLOOD PRESSURE: 126 MMHG | OXYGEN SATURATION: 99 % | BODY MASS INDEX: 24.49 KG/M2

## 2024-12-04 DIAGNOSIS — F32.A ANXIETY DISORDER, UNSPECIFIED: ICD-10-CM

## 2024-12-04 DIAGNOSIS — Z00.00 ENCOUNTER FOR GENERAL ADULT MEDICAL EXAMINATION W/OUT ABNORMAL FINDINGS: ICD-10-CM

## 2024-12-04 DIAGNOSIS — J30.89 OTHER ALLERGIC RHINITIS: ICD-10-CM

## 2024-12-04 DIAGNOSIS — F41.9 ANXIETY DISORDER, UNSPECIFIED: ICD-10-CM

## 2024-12-04 DIAGNOSIS — E78.5 HYPERLIPIDEMIA, UNSPECIFIED: ICD-10-CM

## 2024-12-04 PROCEDURE — 99396 PREV VISIT EST AGE 40-64: CPT

## 2024-12-30 ENCOUNTER — RX RENEWAL (OUTPATIENT)
Age: 64
End: 2024-12-30

## 2025-02-13 ENCOUNTER — APPOINTMENT (OUTPATIENT)
Dept: ORTHOPEDIC SURGERY | Facility: CLINIC | Age: 65
End: 2025-02-13
Payer: COMMERCIAL

## 2025-02-13 PROCEDURE — 99204 OFFICE O/P NEW MOD 45 MIN: CPT

## 2025-02-13 PROCEDURE — 72050 X-RAY EXAM NECK SPINE 4/5VWS: CPT

## 2025-02-13 PROCEDURE — 72110 X-RAY EXAM L-2 SPINE 4/>VWS: CPT

## 2025-02-13 PROCEDURE — 99214 OFFICE O/P EST MOD 30 MIN: CPT

## 2025-02-13 RX ORDER — CYCLOBENZAPRINE HYDROCHLORIDE 10 MG/1
10 TABLET, FILM COATED ORAL
Qty: 30 | Refills: 0 | Status: ACTIVE | COMMUNITY
Start: 2025-02-13 | End: 1900-01-01

## 2025-02-13 RX ORDER — METHYLPREDNISOLONE 4 MG/1
4 TABLET ORAL
Qty: 1 | Refills: 0 | Status: ACTIVE | COMMUNITY
Start: 2025-02-13 | End: 1900-01-01

## 2025-02-22 ENCOUNTER — OUTPATIENT (OUTPATIENT)
Dept: OUTPATIENT SERVICES | Facility: HOSPITAL | Age: 65
LOS: 1 days | End: 2025-02-22
Payer: COMMERCIAL

## 2025-02-22 ENCOUNTER — APPOINTMENT (OUTPATIENT)
Dept: MRI IMAGING | Facility: CLINIC | Age: 65
End: 2025-02-22
Payer: COMMERCIAL

## 2025-02-22 DIAGNOSIS — M48.07 SPINAL STENOSIS, LUMBOSACRAL REGION: ICD-10-CM

## 2025-02-22 DIAGNOSIS — Z98.890 OTHER SPECIFIED POSTPROCEDURAL STATES: Chronic | ICD-10-CM

## 2025-02-22 DIAGNOSIS — M54.16 RADICULOPATHY, LUMBAR REGION: ICD-10-CM

## 2025-02-22 DIAGNOSIS — M47.812 SPONDYLOSIS WITHOUT MYELOPATHY OR RADICULOPATHY, CERVICAL REGION: ICD-10-CM

## 2025-02-22 PROCEDURE — 72148 MRI LUMBAR SPINE W/O DYE: CPT | Mod: 26

## 2025-02-22 PROCEDURE — 72148 MRI LUMBAR SPINE W/O DYE: CPT

## 2025-02-22 PROCEDURE — A9585: CPT

## 2025-02-22 PROCEDURE — 72156 MRI NECK SPINE W/O & W/DYE: CPT

## 2025-02-22 PROCEDURE — 72156 MRI NECK SPINE W/O & W/DYE: CPT | Mod: 26

## 2025-02-24 NOTE — PHYSICAL THERAPY INITIAL EVALUATION ADULT - PHYSICAL ASSIST/NONPHYSICAL ASSIST: STAND/SIT, REHAB EVAL
02/24/25                            Jana Mixon  [id number]  [address]  [place] [id number]    To Whom It May Concern:    This is to certify Jana Gatespaigemeggan was evaluated with Alanis Butts MD on 02/24/25. Please excuse Jana due to appointment.          Sincerely,       Electronically signed by:  Alanis Butts MD  [facility]  [id number] [place] [id number]  Dept Phone: [phone]       
set-up required/supervision/verbal cues

## 2025-03-13 ENCOUNTER — RX RENEWAL (OUTPATIENT)
Age: 65
End: 2025-03-13

## 2025-03-13 ENCOUNTER — APPOINTMENT (OUTPATIENT)
Dept: ORTHOPEDIC SURGERY | Facility: CLINIC | Age: 65
End: 2025-03-13
Payer: COMMERCIAL

## 2025-03-13 PROCEDURE — 99214 OFFICE O/P EST MOD 30 MIN: CPT

## 2025-03-24 ENCOUNTER — APPOINTMENT (OUTPATIENT)
Dept: ORTHOPEDIC SURGERY | Facility: CLINIC | Age: 65
End: 2025-03-24
Payer: COMMERCIAL

## 2025-03-24 DIAGNOSIS — M48.07 SPINAL STENOSIS, LUMBOSACRAL REGION: ICD-10-CM

## 2025-03-24 PROCEDURE — 72110 X-RAY EXAM L-2 SPINE 4/>VWS: CPT

## 2025-03-24 PROCEDURE — 99215 OFFICE O/P EST HI 40 MIN: CPT

## 2025-03-24 PROCEDURE — 72050 X-RAY EXAM NECK SPINE 4/5VWS: CPT

## 2025-04-09 NOTE — ED PROVIDER NOTE - OBJECTIVE STATEMENT
Acute respiratory failure with hypoxia and hypercapnia Acute respiratory failure with hypoxia and hypercapnia Acute respiratory failure with hypoxia and hypercapnia pt is a 57yo female with pmhx of c/spine fusion 12/18, anxiety/depression c/o headache x 15 days. Acute respiratory failure with hypoxia and hypercapnia Acute respiratory failure with hypoxia and hypercapnia Acute respiratory failure with hypoxia and hypercapnia pt is a 59yo female with pmhx of c/spine fusion 12/18, anxiety/depression c/o headache x 15 days. pt reports she has had a headache, like a vice is around her head, for 15 days with vision changes when headache worsens. pt had apt with neuro today at [facility] referred by her surgeon but came to ed instead. pt seen at [facility] ed, admitted and had work up with cta head/neck/chest without acute findings dc last week. pt followed up with surgeon for c/spine fusion last week, dr shepherd, who advised pt symptoms are not from surgery and surgery is healing well. pt is concerned that csf is leaking from nose due to fluid leaking when she walks when she drinks water and hydrates herself. pt denies head injury. pt took tylenol for headache which provided minimal relief.

## 2025-06-18 ENCOUNTER — RX RENEWAL (OUTPATIENT)
Age: 65
End: 2025-06-18

## 2025-08-05 ENCOUNTER — RX RENEWAL (OUTPATIENT)
Age: 65
End: 2025-08-05

## (undated) DEVICE — VENODYNE/SCD SLEEVE CALF MEDIUM

## (undated) DEVICE — DRSG ADAPTIC 3 X 8"

## (undated) DEVICE — SUT POLYSORB 4-0 18" P-12 UNDYED

## (undated) DEVICE — TOURNIQUET CUFF 30" DUAL PORT W PLC

## (undated) DEVICE — SUT MONOSOF 4-0 18" P-12

## (undated) DEVICE — SAW BLADE RASP CONMED LINVATEC MICRO 100 OSCILLATING 5.5 X 13 X 0.4MM

## (undated) DEVICE — DRSG WEBRIL 3"

## (undated) DEVICE — DRAPE 3/4 SHEET W REINFORCEMENT 56X77"

## (undated) DEVICE — VENODYNE/SCD SLEEVE CALF LARGE

## (undated) DEVICE — DRSG TELFA 3 X 8

## (undated) DEVICE — SOL INJ NS 0.9% 500ML 1-PORT

## (undated) DEVICE — DRSG CAST PLASTER 6" (BLUE)

## (undated) DEVICE — SUT POLYSORB 3-0 18" P-12 UNDYED

## (undated) DEVICE — DRSG KLING 6"

## (undated) DEVICE — WARMING BLANKET UPPER ADULT

## (undated) DEVICE — SUT ETHIBOND 2-0 30" RB-1

## (undated) DEVICE — DRSG KERLIX ROLL 4.5"

## (undated) DEVICE — PLV/PSP-TOURNIQUET #11 402009190014: Type: DURABLE MEDICAL EQUIPMENT

## (undated) DEVICE — DRSG KLING 3"

## (undated) DEVICE — SYR LUER LOK 10CC

## (undated) DEVICE — PLV/PSP-ESU T7E14761DX: Type: DURABLE MEDICAL EQUIPMENT

## (undated) DEVICE — PLV-SCD MACHINE: Type: DURABLE MEDICAL EQUIPMENT

## (undated) DEVICE — DRSG COMBINE 5X9"

## (undated) DEVICE — SUT POLYSORB 4-0 30" V-20 UNDYED

## (undated) DEVICE — PACK LOWER EXTREMITY NS PLAINVI

## (undated) DEVICE — DRSG CAST FIBERGLASS 4"

## (undated) DEVICE — SUT POLYSORB 3-0 30" V-20 UNDYED

## (undated) DEVICE — SUT MONOSOF 3-0 18" C-14

## (undated) DEVICE — DRSG WEBRIL 6"

## (undated) DEVICE — SUT FIBERLINK 2 26" (BLUE)

## (undated) DEVICE — TOURNIQUET CUFF 18" DUAL PORT SINGLE BLADDER LUER LOCK (BLACK)

## (undated) DEVICE — GLV 7.5 PROTEXIS (WHITE)

## (undated) DEVICE — TOURNIQUET ESMARK 4"

## (undated) DEVICE — NDL HYPO REGULAR BEVEL 25G X 1.5" (BLUE)

## (undated) DEVICE — PLV/PSP-ESU FORCEFX F8I7418A: Type: DURABLE MEDICAL EQUIPMENT

## (undated) DEVICE — Device

## (undated) DEVICE — SOL IRR POUR NS 0.9% 1000ML

## (undated) DEVICE — DRSG KLING 4"

## (undated) DEVICE — GLV 6.5 PROTEXIS (WHITE)

## (undated) DEVICE — DRSG CURITY GAUZE SPONGE 4 X 4" 12-PLY

## (undated) DEVICE — NDL HYPO SAFE 18G X 1.5" (PINK)

## (undated) DEVICE — ARTHREX KIT TENODESIS GRAFT SIZING FOOT & ANKLE

## (undated) DEVICE — PLV/PSP-TOURNIQUET #5 402009010011: Type: DURABLE MEDICAL EQUIPMENT